# Patient Record
Sex: MALE | Race: WHITE | NOT HISPANIC OR LATINO | Employment: OTHER | URBAN - METROPOLITAN AREA
[De-identification: names, ages, dates, MRNs, and addresses within clinical notes are randomized per-mention and may not be internally consistent; named-entity substitution may affect disease eponyms.]

---

## 2017-01-24 ENCOUNTER — TELEPHONE (OUTPATIENT)
Dept: MEDICAL GROUP | Age: 62
End: 2017-01-24

## 2017-01-24 NOTE — TELEPHONE ENCOUNTER
1. Caller Name: Rosaura (wife)                                         Call Back Number: [phone]      Patient approves a detailed voicemail message: yes    2. Patient is requesting lab results dated: 11/02/16    3. Results scanned into media. Patient advised they will be contacted once interpreted by provider.pt wants to know if he should remain on the same dosage of the Levothyroxine?

## 2017-01-26 ENCOUNTER — TELEPHONE (OUTPATIENT)
Dept: MEDICAL GROUP | Age: 62
End: 2017-01-26

## 2017-01-26 NOTE — TELEPHONE ENCOUNTER
Phone Number Called: [phone] (home)     Message: Left message for patient to call back regarding provider message.    Left Message for patient to call back: yes

## 2017-01-26 NOTE — TELEPHONE ENCOUNTER
Phone Number Called: [phone] (home)     Message: Pt wants to know if he should continue on the same dosage of Levothyroxine. Please advise    Left Message for patient to call back: no

## 2017-01-26 NOTE — TELEPHONE ENCOUNTER
All normal; sorry for the delay- they were in a different part of the chart (media) and did not come through my inbasket since he had [facility]

## 2017-01-26 NOTE — TELEPHONE ENCOUNTER
Phone Number Called: [phone] (home)     Message: Left message for the patient to call us back regarding the note below.      Left Message for patient to call back: yes

## 2017-01-26 NOTE — TELEPHONE ENCOUNTER
Phone Number Called: [phone] (home)     Message: Left VM for patient to call back    Left Message for patient to call back: yes

## 2017-01-27 NOTE — TELEPHONE ENCOUNTER
FYI ONLY    Phone Number Called: [phone] (home)     Message: Patient's wife Rosaura notified of results and to continue Levothyroxine 70 MCG, verbalized understanding    Left Message for patient to call back: no

## 2017-05-30 DIAGNOSIS — D48.5 NEOPLASM OF UNCERTAIN BEHAVIOR OF SKIN: ICD-10-CM

## 2017-09-21 ENCOUNTER — OFFICE VISIT (OUTPATIENT)
Dept: MEDICAL GROUP | Age: 62
End: 2017-09-21
Payer: COMMERCIAL

## 2017-09-21 ENCOUNTER — HOSPITAL ENCOUNTER (OUTPATIENT)
Dept: RADIOLOGY | Facility: MEDICAL CENTER | Age: 62
End: 2017-09-21
Attending: INTERNAL MEDICINE
Payer: COMMERCIAL

## 2017-09-21 VITALS
TEMPERATURE: 97.5 F | HEART RATE: 70 BPM | HEIGHT: 70 IN | WEIGHT: 161 LBS | BODY MASS INDEX: 23.05 KG/M2 | DIASTOLIC BLOOD PRESSURE: 70 MMHG | OXYGEN SATURATION: 97 % | SYSTOLIC BLOOD PRESSURE: 104 MMHG

## 2017-09-21 DIAGNOSIS — R61 CHRONIC NIGHT SWEATS: ICD-10-CM

## 2017-09-21 DIAGNOSIS — E03.4 HYPOTHYROIDISM DUE TO ACQUIRED ATROPHY OF THYROID: ICD-10-CM

## 2017-09-21 DIAGNOSIS — M54.50 CHRONIC MIDLINE LOW BACK PAIN WITHOUT SCIATICA: ICD-10-CM

## 2017-09-21 DIAGNOSIS — R45.4 IRRITABILITY AND ANGER: ICD-10-CM

## 2017-09-21 DIAGNOSIS — Z12.5 SCREENING FOR PROSTATE CANCER: ICD-10-CM

## 2017-09-21 DIAGNOSIS — F41.9 ANXIETY AND DEPRESSION: ICD-10-CM

## 2017-09-21 DIAGNOSIS — E78.2 MIXED HYPERLIPIDEMIA: ICD-10-CM

## 2017-09-21 DIAGNOSIS — F32.A ANXIETY AND DEPRESSION: ICD-10-CM

## 2017-09-21 DIAGNOSIS — F17.200 TOBACCO DEPENDENCE: ICD-10-CM

## 2017-09-21 DIAGNOSIS — G89.29 CHRONIC MIDLINE LOW BACK PAIN WITHOUT SCIATICA: ICD-10-CM

## 2017-09-21 DIAGNOSIS — E55.9 VITAMIN D DEFICIENCY DISEASE: ICD-10-CM

## 2017-09-21 DIAGNOSIS — Z23 NEED FOR INFLUENZA VACCINATION: ICD-10-CM

## 2017-09-21 DIAGNOSIS — M15.9 PRIMARY OSTEOARTHRITIS INVOLVING MULTIPLE JOINTS: ICD-10-CM

## 2017-09-21 PROCEDURE — 90686 IIV4 VACC NO PRSV 0.5 ML IM: CPT | Performed by: INTERNAL MEDICINE

## 2017-09-21 PROCEDURE — 99215 OFFICE O/P EST HI 40 MIN: CPT | Mod: 25 | Performed by: INTERNAL MEDICINE

## 2017-09-21 PROCEDURE — 90471 IMMUNIZATION ADMIN: CPT | Performed by: INTERNAL MEDICINE

## 2017-09-21 PROCEDURE — 71020 DX-CHEST-2 VIEWS: CPT

## 2017-09-21 ASSESSMENT — ENCOUNTER SYMPTOMS
MUSCULOSKELETAL NEGATIVE: 1
GASTROINTESTINAL NEGATIVE: 1
CARDIOVASCULAR NEGATIVE: 1
CONSTITUTIONAL NEGATIVE: 1
RESPIRATORY NEGATIVE: 1
NEUROLOGICAL NEGATIVE: 1
EYES NEGATIVE: 1
PSYCHIATRIC NEGATIVE: 1

## 2017-09-21 NOTE — Clinical Note
Please let patient know that I added an ultrasound the abdomen to the patient's orders to make sure there is no lymphoma or tumors on the kidney that might be causing his night sweats

## 2017-09-21 NOTE — PROGRESS NOTES
"Subjective:      Hans Vega is a 62 y.o. male who presents with Hyperlipidemia (evaluation on blood work results) and Other (night sweats since April on and off )  and fatigue and irritablility/anger issues.    And   The patient is here for followup of chronic medical problems listed below. The patient is compliant with medications and having no side effects from them. Denies chest pain, abdominal pain, dyspnea, myalgias, or cough.   Patient Active Problem List    Diagnosis Date Noted   • Chronic night sweats 09/21/2017   • Irritability and anger 09/21/2017   • Vitamin D deficiency disease 12/11/2013   • Primary osteoarthritis involving multiple joints 07/10/2013   • Hypothyroidism due to acquired atrophy of thyroid 04/04/2013   • Tobacco dependence 04/04/2013   • Anxiety and depression 01/07/2013   • Mixed hyperlipidemia 01/07/2013   • Chronic midline low back pain without sciatica 12/17/2012     Allergies   Allergen Reactions   • Crestor [Rosuvastatin Calcium]      Myalgias     • Zetia [Ezetimibe]      myalgia         Outpatient Medications Prior to Visit   Medication Sig Dispense Refill   • levothyroxine (SYNTHROID) 75 MCG Tab TAKE ONE TABLET BY MOUTH ONCE DAILY 90 Tab 4   • lovastatin (MEVACOR) 20 MG Tab TAKE THREE TABLETS BY MOUTH ONCE DAILY 270 Tab 4   • Cholecalciferol (VITAMIN D-3) 5000 UNITS TABS Take 5,000 Units by mouth every day. 100 Tab 3     No facility-administered medications prior to visit.                HPI    Review of Systems   Constitutional: Negative.    HENT: Negative.    Eyes: Negative.    Respiratory: Negative.    Cardiovascular: Negative.    Gastrointestinal: Negative.    Genitourinary: Negative.    Musculoskeletal: Negative.    Skin: Negative.    Neurological: Negative.    Endo/Heme/Allergies: Negative.    Psychiatric/Behavioral: Negative.           Objective:     /70   Pulse 70   Temp 36.4 °C (97.5 °F)   Ht 1.778 m (5' 10\")   Wt 73 kg (161 lb)   SpO2 97%   BMI 23.10 " kg/m²      Physical Exam   Constitutional: He is oriented to person, place, and time. He appears well-developed and well-nourished. No distress.   HENT:   Head: Normocephalic and atraumatic.   Right Ear: External ear normal.   Left Ear: External ear normal.   Nose: Nose normal.   Mouth/Throat: Oropharynx is clear and moist. No oropharyngeal exudate.   Eyes: Conjunctivae and EOM are normal. Pupils are equal, round, and reactive to light. Right eye exhibits no discharge. Left eye exhibits no discharge. No scleral icterus.   Neck: Normal range of motion. Neck supple. No JVD present. No tracheal deviation present. No thyromegaly present.   Cardiovascular: Normal rate, regular rhythm, normal heart sounds and intact distal pulses.  Exam reveals no gallop and no friction rub.    No murmur heard.  Pulmonary/Chest: Effort normal and breath sounds normal. No stridor. No respiratory distress. He has no wheezes. He has no rales. He exhibits no tenderness.   Abdominal: Soft. Bowel sounds are normal. He exhibits no distension and no mass. There is no tenderness. There is no rebound and no guarding.   Musculoskeletal: Normal range of motion. He exhibits no edema or tenderness.   Lymphadenopathy:     He has no cervical adenopathy.   Neurological: He is alert and oriented to person, place, and time. He has normal reflexes. He displays normal reflexes. He exhibits normal muscle tone. Coordination normal.   Skin: Skin is warm and dry. No rash noted. He is not diaphoretic. No erythema. No pallor.   Psychiatric: He has a normal mood and affect. His behavior is normal. Judgment and thought content normal.     No visits with results within 1 Month(s) from this visit.   Latest known visit with results is:   Hospital Outpatient Visit on 09/02/2015   Component Date Value   • WBC 09/03/2015 13.7*   • RBC 09/03/2015 5.14    • Hemoglobin 09/03/2015 16.9    • Hematocrit 09/03/2015 50.3    • MCV 09/03/2015 97.9*   • MCH 09/03/2015 32.9    • MCHC  09/03/2015 33.6*   • RDW 09/03/2015 50.5*   • Platelet Count 09/03/2015 183    • MPV 09/03/2015 10.9    • Neutrophils-Polys 09/03/2015 62.70    • Lymphocytes 09/03/2015 26.10    • Monocytes 09/03/2015 7.70    • Eosinophils 09/03/2015 1.80    • Basophils 09/03/2015 0.90    • Immature Granulocytes 09/03/2015 0.80    • Nucleated RBC 09/03/2015 0.00    • Neutrophils (Absolute) 09/03/2015 8.57*   • Lymphs (Absolute) 09/03/2015 3.56    • Monos (Absolute) 09/03/2015 1.05*   • Eos (Absolute) 09/03/2015 0.24    • Baso (Absolute) 09/03/2015 0.12    • Immature Granulocytes (a* 09/03/2015 0.11    • NRBC (Absolute) 09/03/2015 0.00    • Sodium 09/03/2015 138    • Potassium 09/03/2015 4.3    • Chloride 09/03/2015 110    • Co2 09/03/2015 22    • Anion Gap 09/03/2015 6.0    • Glucose 09/03/2015 104*   • Bun 09/03/2015 18    • Creatinine 09/03/2015 1.06    • Calcium 09/03/2015 9.5    • AST(SGOT) 09/03/2015 19    • ALT(SGPT) 09/03/2015 26    • Alkaline Phosphatase 09/03/2015 57    • Total Bilirubin 09/03/2015 0.4    • Albumin 09/03/2015 4.4    • Total Protein 09/03/2015 7.5    • Globulin 09/03/2015 3.1    • A-G Ratio 09/03/2015 1.4    • Free T-4 09/03/2015 0.96    • TSH 09/03/2015 4.640*   • 25-Hydroxy   Vitamin D 25 09/03/2015 46    • Cholesterol,Tot 09/03/2015 226*   • Triglycerides 09/03/2015 92    • HDL 09/03/2015 54    • LDL 09/03/2015 154*   • GFR If  09/03/2015 >60    • GFR If Non  Ameri* 09/03/2015 >60       No results found for: HBA1C  Lab Results   Component Value Date/Time    SODIUM 138 09/02/2015 09:04 AM    POTASSIUM 4.3 09/02/2015 09:04 AM    CHLORIDE 110 09/02/2015 09:04 AM    CO2 22 09/02/2015 09:04 AM    GLUCOSE 104 (H) 09/02/2015 09:04 AM    BUN 18 09/02/2015 09:04 AM    CREATININE 1.06 09/02/2015 09:04 AM    ALKPHOSPHAT 57 09/02/2015 09:04 AM    ASTSGOT 19 09/02/2015 09:04 AM    ALTSGPT 26 09/02/2015 09:04 AM    TBILIRUBIN 0.4 09/02/2015 09:04 AM     No results found for: INR  Lab Results    Component Value Date/Time    CHOLSTRLTOT 226 (H) 09/02/2015 09:04 AM     (H) 09/02/2015 09:04 AM    HDL 54 09/02/2015 09:04 AM    TRIGLYCERIDE 92 09/02/2015 09:04 AM       No results found for: TESTOSTERONE  No results found for: TSH  Lab Results   Component Value Date/Time    FREET4 0.96 09/02/2015 09:04 AM    FREET4 0.93 11/05/2014 09:05 AM     Lab Results   Component Value Date/Time    URICACID 5.6 12/24/2012 08:46 AM     No components found for: VITB12  Lab Results   Component Value Date/Time    25HYDROXY 46 09/02/2015 09:04 AM    25HYDROXY 57 11/05/2014 09:05 AM               Assessment/Plan:     1. Chronic night sweats    This is a unclear etiology. His lab done last week was unremarkable his physical exams also unremarkable. To be sure there is no underlying infection will get chest x-ray and urinalysis. Make sure it is no more old issue will get TSH and testosterone level. This may be a chronic recurrent viral syndrome versus simple idiopathic. The only are thing to look forward the possible renal cell CA which we would need to order an ultrasound of the kidneys or abdomen. We will add this to the orders even if the urinalysis is normal.      - DX-CHEST-2 VIEWS; Future  - POCT Urinalysis  - URINE CULTURE(NEW); Future    2. Irritability and anger   Probably manifestation of underlying depression. We'll need to see patient back for further follow-up to discuss possible depressant therapy and/or counseling which he has declined.    3. Hypothyroidism due to acquired atrophy of thyroid   Under good control. Continue same regimen.    - TSH; Future  - PROSTATE SPECIFIC AG DIAGNOSTIC; Future  - TESTOSTERONE, FREE AND TOTAL    4. Tobacco dependence   Patient counseled. Encouraged to quit tobacco products. NicoDerm prescribed.      5. Mixed hyperlipidemia   Under good control. Continue same regimen.    - LIPID PROFILE; Future  - CBC WITH DIFFERENTIAL; Future  - WESTERGREN SED RATE; Future  - CRP HIGH  SENSITIVE (CARDIAC); Future  - COMP METABOLIC PANEL; Future    6. Vitamin D deficiency disease    Under good control. Continue same regimen.    7. Anxiety and depression     Under good control. Continue same regimen.    8. Screening for prostate cancer        - PROSTATE SPECIFIC AG SCREENING; Future    9. Need for influenza vaccination     - Flu Quad Inj >3 Year Pre-Filled (Preservative Free)    10. Primary osteoarthritis involving multiple joints    Under good control. Continue same regimen.  11. Chronic midline low back pain without sciatica     Under good control. Continue same regimen.        40 minute face-to-face encounter took place today.  More than half of this time was spent in the coordination of care of the above problems, as well as counseling.

## 2017-11-20 DIAGNOSIS — E78.5 HYPERLIPIDEMIA, UNSPECIFIED HYPERLIPIDEMIA TYPE: ICD-10-CM

## 2017-11-21 RX ORDER — LEVOTHYROXINE SODIUM 0.07 MG/1
TABLET ORAL
Qty: 90 TAB | Refills: 0 | Status: SHIPPED | OUTPATIENT
Start: 2017-11-21 | End: 2018-03-21 | Stop reason: SDUPTHER

## 2017-11-21 RX ORDER — LOVASTATIN 20 MG/1
TABLET ORAL
Qty: 90 TAB | Refills: 0 | Status: SHIPPED | OUTPATIENT
Start: 2017-11-21 | End: 2018-01-12 | Stop reason: SDUPTHER

## 2017-11-22 ENCOUNTER — APPOINTMENT (RX ONLY)
Dept: URBAN - METROPOLITAN AREA CLINIC 4 | Facility: CLINIC | Age: 62
Setting detail: DERMATOLOGY
End: 2017-11-22

## 2017-11-22 DIAGNOSIS — L73.8 OTHER SPECIFIED FOLLICULAR DISORDERS: ICD-10-CM

## 2017-11-22 DIAGNOSIS — L72.8 OTHER FOLLICULAR CYSTS OF THE SKIN AND SUBCUTANEOUS TISSUE: ICD-10-CM

## 2017-11-22 PROCEDURE — ? OBSERVATION AND MEASURE

## 2017-11-22 PROCEDURE — ? ADDITIONAL NOTES

## 2017-11-22 PROCEDURE — ? COUNSELING

## 2017-11-22 PROCEDURE — 99202 OFFICE O/P NEW SF 15 MIN: CPT

## 2017-11-22 ASSESSMENT — LOCATION DETAILED DESCRIPTION DERM
LOCATION DETAILED: RIGHT CENTRAL MALAR CHEEK
LOCATION DETAILED: LEFT SUPERIOR UPPER BACK

## 2017-11-22 ASSESSMENT — LOCATION SIMPLE DESCRIPTION DERM
LOCATION SIMPLE: RIGHT CHEEK
LOCATION SIMPLE: LEFT UPPER BACK

## 2017-11-22 ASSESSMENT — LOCATION ZONE DERM
LOCATION ZONE: TRUNK
LOCATION ZONE: FACE

## 2017-11-22 NOTE — PROCEDURE: ADDITIONAL NOTES
Additional Notes: Advised it is cosmetic if would like treated
Additional Notes: Advised can have excised if bothersome with MD

## 2018-03-21 ENCOUNTER — OFFICE VISIT (OUTPATIENT)
Dept: MEDICAL GROUP | Age: 63
End: 2018-03-21
Payer: COMMERCIAL

## 2018-03-21 VITALS
DIASTOLIC BLOOD PRESSURE: 80 MMHG | TEMPERATURE: 97.3 F | HEIGHT: 70 IN | BODY MASS INDEX: 23.28 KG/M2 | OXYGEN SATURATION: 94 % | WEIGHT: 162.6 LBS | SYSTOLIC BLOOD PRESSURE: 122 MMHG | HEART RATE: 60 BPM

## 2018-03-21 DIAGNOSIS — E55.9 VITAMIN D DEFICIENCY DISEASE: ICD-10-CM

## 2018-03-21 DIAGNOSIS — D48.5 NEOPLASM OF UNCERTAIN BEHAVIOR OF SKIN: ICD-10-CM

## 2018-03-21 DIAGNOSIS — E78.2 MIXED HYPERLIPIDEMIA: ICD-10-CM

## 2018-03-21 DIAGNOSIS — E03.4 HYPOTHYROIDISM DUE TO ACQUIRED ATROPHY OF THYROID: ICD-10-CM

## 2018-03-21 DIAGNOSIS — Z23 NEED FOR VACCINATION: ICD-10-CM

## 2018-03-21 DIAGNOSIS — F17.200 TOBACCO DEPENDENCE: ICD-10-CM

## 2018-03-21 DIAGNOSIS — R61 CHRONIC NIGHT SWEATS: ICD-10-CM

## 2018-03-21 DIAGNOSIS — H92.09 OTALGIA, UNSPECIFIED LATERALITY: ICD-10-CM

## 2018-03-21 PROBLEM — R45.4 IRRITABILITY AND ANGER: Status: RESOLVED | Noted: 2017-09-21 | Resolved: 2018-03-21

## 2018-03-21 PROCEDURE — 90732 PPSV23 VACC 2 YRS+ SUBQ/IM: CPT | Performed by: INTERNAL MEDICINE

## 2018-03-21 PROCEDURE — 99214 OFFICE O/P EST MOD 30 MIN: CPT | Mod: 25 | Performed by: INTERNAL MEDICINE

## 2018-03-21 PROCEDURE — 90715 TDAP VACCINE 7 YRS/> IM: CPT | Performed by: INTERNAL MEDICINE

## 2018-03-21 PROCEDURE — 90472 IMMUNIZATION ADMIN EACH ADD: CPT | Performed by: INTERNAL MEDICINE

## 2018-03-21 PROCEDURE — 90471 IMMUNIZATION ADMIN: CPT | Performed by: INTERNAL MEDICINE

## 2018-03-21 RX ORDER — LOVASTATIN 20 MG/1
40 TABLET ORAL DAILY
Qty: 180 TAB | Refills: 4 | Status: SHIPPED | OUTPATIENT
Start: 2018-03-21 | End: 2018-09-25

## 2018-03-21 RX ORDER — LEVOTHYROXINE SODIUM 0.07 MG/1
TABLET ORAL
Qty: 90 TAB | Refills: 4 | Status: SHIPPED | OUTPATIENT
Start: 2018-03-21 | End: 2019-04-17 | Stop reason: SDUPTHER

## 2018-03-21 ASSESSMENT — ENCOUNTER SYMPTOMS
CONSTITUTIONAL NEGATIVE: 1
PSYCHIATRIC NEGATIVE: 1
EYES NEGATIVE: 1
RESPIRATORY NEGATIVE: 1
GASTROINTESTINAL NEGATIVE: 1
MUSCULOSKELETAL NEGATIVE: 1
CARDIOVASCULAR NEGATIVE: 1
NEUROLOGICAL NEGATIVE: 1

## 2018-03-21 ASSESSMENT — PATIENT HEALTH QUESTIONNAIRE - PHQ9
SUM OF ALL RESPONSES TO PHQ QUESTIONS 1-9: 7
CLINICAL INTERPRETATION OF PHQ2 SCORE: 3
5. POOR APPETITE OR OVEREATING: 0 - NOT AT ALL

## 2018-03-21 NOTE — PROGRESS NOTES
"Subjective:      Hans Vega is a 62 y.o. male who presents with Skin Lesion (Right ear, Reoccurring, Right ear, top); Otalgia (Left Ear); Sweats (At night); and Results  and  The patient is here for followup of chronic medical problems listed below. The patient is compliant with medications and having no side effects from them. Denies chest pain, abdominal pain, dyspnea, myalgias, or cough.   Patient Active Problem List    Diagnosis Date Noted   • Otalgia 03/21/2018   • Neoplasm of uncertain behavior of skin- right ear helix 03/21/2018   • Chronic night sweats 09/21/2017   • Vitamin D deficiency disease 12/11/2013   • Hypothyroidism due to acquired atrophy of thyroid 04/04/2013   • Tobacco dependence 04/04/2013   • Mixed hyperlipidemia 01/07/2013        Outpatient Medications Prior to Visit   Medication Sig Dispense Refill   • Cholecalciferol (VITAMIN D-3) 5000 UNITS TABS Take 5,000 Units by mouth every day. 100 Tab 3   • lovastatin (MEVACOR) 20 MG Tab TAKE THREE TABLETS BY MOUTH ONCE DAILY 90 Tab 4   • levothyroxine (SYNTHROID) 75 MCG Tab TAKE ONE TABLET BY MOUTH ONCE DAILY 90 Tab 0     No facility-administered medications prior to visit.        .  Allergies   Allergen Reactions   • Crestor [Rosuvastatin Calcium]      Myalgias     • Zetia [Ezetimibe]      myalgia           .          HPI    Review of Systems   Constitutional: Negative.    HENT: Negative.    Eyes: Negative.    Respiratory: Negative.    Cardiovascular: Negative.    Gastrointestinal: Negative.    Genitourinary: Negative.    Musculoskeletal: Negative.    Skin: Negative.    Neurological: Negative.    Endo/Heme/Allergies: Negative.    Psychiatric/Behavioral: Negative.           Objective:     /80   Pulse 60   Temp 36.3 °C (97.3 °F)   Ht 1.778 m (5' 10\")   Wt 73.8 kg (162 lb 9.6 oz)   SpO2 94%   BMI 23.33 kg/m²      Physical Exam   Constitutional: He is oriented to person, place, and time. He appears well-developed and well-nourished. No " distress.   HENT:   Head: Normocephalic and atraumatic.   Left Ear: External ear normal.   Nose: Nose normal.   Mouth/Throat: Oropharynx is clear and moist. No oropharyngeal exudate.   1 cm raised pearly lesion with central .5 cm ulceration right superior helix   Eyes: Conjunctivae and EOM are normal. Pupils are equal, round, and reactive to light. Right eye exhibits no discharge. Left eye exhibits no discharge. No scleral icterus.   Neck: Normal range of motion. Neck supple. No JVD present. No tracheal deviation present. No thyromegaly present.   Cardiovascular: Normal rate, regular rhythm, normal heart sounds and intact distal pulses.  Exam reveals no gallop and no friction rub.    No murmur heard.  Pulmonary/Chest: Effort normal and breath sounds normal. No stridor. No respiratory distress. He has no wheezes. He has no rales. He exhibits no tenderness.   Abdominal: Soft. Bowel sounds are normal. He exhibits no distension and no mass. There is no tenderness. There is no rebound and no guarding.   Musculoskeletal: Normal range of motion. He exhibits no edema or tenderness.   Lymphadenopathy:     He has no cervical adenopathy.   Neurological: He is alert and oriented to person, place, and time. He has normal reflexes. He displays normal reflexes. He exhibits normal muscle tone. Coordination normal.   Skin: Skin is warm and dry. No rash noted. He is not diaphoretic. No erythema. No pallor.   Psychiatric: He has a normal mood and affect. His behavior is normal. Judgment and thought content normal.   [facility] wnl x for FUC=[id number] BUT GOOD TC/HDL =<4.0- [facility]F [facility]            Assessment/Plan:     1. Need for vaccination      - Pneumococal Polysaccharide Vaccine 23-Valent =>3yo SQ/IM  - TDAP VACCINE =>8YO IM    2. Hypothyroidism due to acquired atrophy of thyroid     - levothyroxine (SYNTHROID) 75 MCG Tab; TAKE ONE TABLET BY MOUTH ONCE DAILY  Dispense: 90 Tab; Refill: 4  - TSH; Future    3. Mixed  hyperlipidemia   BORDERLINE CONTROL- CANNOT TOLERATE HIGHER DOSE OF STATIN; CONT CURRENT DOSE:  - lovastatin (MEVACOR) 20 MG Tab; Take 2 Tabs by mouth every day.  Dispense: 180 Tab; Refill: 4  - COMP METABOLIC PANEL; Future  - LIPID PROFILE; Future  - CBC WITH DIFFERENTIAL; Future    4. Tobacco dependence   Patient counseled. Encouraged to quit tobacco products. NicoDerm prescribed.      5. Chronic night sweats    ? ETIOLOGY. SURVEILLANCE- ALL LABS AND CXR UNREMARKABLE; NO WT LOSS COUGH ABD PAIN DIARRHEA OR OTHER CONSTITUTION SX.     6. Otalgia, unspecified laterality     - REFERRAL TO ENT    7. 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 Vitamin D deficiency disease   Under good control. Continue same regimen.  VIT D3 5KU QD    30 minute face-to-face encounter took place today.  More than half of this time was spent in the coordination of care of the above problems, as well as counseling.

## 2018-03-21 NOTE — PATIENT INSTRUCTIONS
Basal Cell Carcinoma  Introduction  Basal cell carcinoma is the most common form of skin cancer. It begins in the basal cells, which are at the bottom of the outer skin layer (epidermis). It occurs most often on parts of the body that are frequently exposed to the sun, such as:  · Parts of the head, including the scalp or face.  · Ears.  · Neck.  · Arms or legs.  · Backs of the hands.  Basal cell carcinoma can almost always be cured. It rarely spreads to other areas of the body (metastasizes). Basal cell carcinoma may come back at the same location (recur), but it can be treated again if this occurs.  What are the causes?  This condition is usually caused by exposure to ultraviolet (UV) light. UV light may come from the sun or from tanning beds. Other causes include:  · Exposure to arsenic.  · Exposure to radiation.  · Exposure to toxic tars and oils.  · Certain genetic conditions, such as xeroderma pigmentosum.  What increases the risk?  This condition is more likely to develop in:  · People who are older than 40 years of age.  · People who have fair skin (light complexion).  · People who have blonde or red hair.  · People who have blue, green, or gray eyes.  · People who have childhood freckling.  · People who have had sun exposure over long periods of time, especially during childhood.  · People who have had repeated sunburns.  · People who have a weakened immune system.  · People who have been exposed to certain chemicals, such as tar, soot, and arsenic.  · People who have chronic inflammatory conditions.  · People who have chronic infections.  · People who use tanning beds.  What are the signs or symptoms?  The main symptom of this condition is a growth or lesion on the skin. The shape and color of the growth or lesion may vary. The five main types include:  · An open sore that may remain open for 3 weeks or longer. The sore may bleed or crust. This type of lesion can be an early sign of basal cell carcinoma.  Basal cell carcinoma often shows up as a sore that does not heal.  · A reddish area that may crust, itch, or cause discomfort. This may occur on areas that are exposed to the sun. These patches might be easier to feel than to see.  · A shiny or clear bump that is red, white, or pink. In people who have dark hair, the bump is often tan, black, or brown. These bumps can look like moles.  · A pink growth with a raised border. The growth will have a crusted and indented area in the center. Small blood vessels may appear on the surface of the growth as it gets bigger.  · A scarlike area that looks like shiny, stretched skin. The area may be white, yellow, or waxy. It often has irregular borders. This may be a sign of more aggressive basal cell carcinoma.  How is this diagnosed?  This condition may be diagnosed with:  · A physical exam.  · Removal of a tissue sample to be examined under a microscope (biopsy).  How is this treated?  Treatment for this condition involves removing the cancerous tissue. The method that is used for this depends on the type, size, location, and number of tumors. Possible treatments include:  · Mohs surgery. In this procedure, the cancerous skin cells are removed layer by layer until all of the tumor has been removed.  · Surgical removal (excision) of the tumor. This involves removing the entire tumor and a small amount of normal skin that surrounds it.  · Cryosurgery. This involves freezing the tumor with liquid nitrogen.  · Plastic surgery. The tumor is removed, and healthy skin from another part of the body is used to cover the wound. This may be done for large tumors that are in areas where it is not possible to stretch the nearby skin to sew the edges of the wound together.  · Radiation. This may be used for tumors on the face.  · Photodynamic therapy. A chemical cream is applied to the skin, and light exposure is used to activate the chemical.  · Electrodesiccation and curettage. This  involves alternately scraping and burning the tumor while using an electric current to control bleeding.  · Chemical treatments, such as imiquimod cream and interferon injections. These may be used to remove superficial tumors with minimal scarring.  Follow these instructions at home:  · Avoid unprotected sun exposure.  · Do self-exams as told by your health care provider. Look for new spots or changes in your skin.  · Keep all follow-up visits as told by your health care provider. This is important.  How is this prevented?  · Avoid the sun when it is the strongest. This is usually between 10:00 a.m. and 4:00 p.m.  · When you are out in the sun, use a sunscreen that has a sun protection factor (SPF) of at least 30.  · Apply sunscreen at least 30 minutes before exposure to the sun.  · Reapply sunscreen every 2-4 hours while you are outside. Also reapply it after swimming and after excessive sweating.  · Always wear hats, protective clothing, and UV-blocking sunglasses when you are outdoors.  · Do not use tanning beds.  Contact a health care provider if:  · You notice any new spots or any changes in your skin.  · You have had a basal cell carcinoma tumor removed and you notice a new growth in the same location.  This information is not intended to replace advice given to you by your health care provider. Make sure you discuss any questions you have with your health care provider.  Document Released: 06/23/2004 Document Revised: 05/25/2017 Document Reviewed: 04/11/2016  © 2017 [employer]

## 2018-09-25 ENCOUNTER — OFFICE VISIT (OUTPATIENT)
Dept: MEDICAL GROUP | Age: 63
End: 2018-09-25
Payer: COMMERCIAL

## 2018-09-25 VITALS
HEART RATE: 58 BPM | WEIGHT: 160 LBS | BODY MASS INDEX: 22.9 KG/M2 | HEIGHT: 70 IN | TEMPERATURE: 97.9 F | DIASTOLIC BLOOD PRESSURE: 75 MMHG | OXYGEN SATURATION: 97 % | SYSTOLIC BLOOD PRESSURE: 116 MMHG

## 2018-09-25 DIAGNOSIS — Z23 IMMUNIZATION DUE: ICD-10-CM

## 2018-09-25 DIAGNOSIS — E78.2 MIXED HYPERLIPIDEMIA: ICD-10-CM

## 2018-09-25 DIAGNOSIS — E55.9 VITAMIN D DEFICIENCY DISEASE: ICD-10-CM

## 2018-09-25 DIAGNOSIS — F17.200 TOBACCO DEPENDENCE: ICD-10-CM

## 2018-09-25 DIAGNOSIS — E03.4 HYPOTHYROIDISM DUE TO ACQUIRED ATROPHY OF THYROID: ICD-10-CM

## 2018-09-25 DIAGNOSIS — D48.5 NEOPLASM OF UNCERTAIN BEHAVIOR OF SKIN: ICD-10-CM

## 2018-09-25 PROBLEM — H92.09 OTALGIA: Status: RESOLVED | Noted: 2018-03-21 | Resolved: 2018-09-25

## 2018-09-25 PROBLEM — R61 CHRONIC NIGHT SWEATS: Status: RESOLVED | Noted: 2017-09-21 | Resolved: 2018-09-25

## 2018-09-25 PROCEDURE — 90471 IMMUNIZATION ADMIN: CPT | Performed by: INTERNAL MEDICINE

## 2018-09-25 PROCEDURE — 90686 IIV4 VACC NO PRSV 0.5 ML IM: CPT | Performed by: INTERNAL MEDICINE

## 2018-09-25 PROCEDURE — 99214 OFFICE O/P EST MOD 30 MIN: CPT | Mod: 25 | Performed by: INTERNAL MEDICINE

## 2018-09-25 RX ORDER — ROSUVASTATIN CALCIUM 20 MG/1
20 TABLET, COATED ORAL EVERY EVENING
Qty: 90 TAB | Refills: 4 | Status: SHIPPED | OUTPATIENT
Start: 2018-09-25 | End: 2019-03-26

## 2018-09-25 ASSESSMENT — ENCOUNTER SYMPTOMS
MUSCULOSKELETAL NEGATIVE: 1
EYES NEGATIVE: 1
CARDIOVASCULAR NEGATIVE: 1
RESPIRATORY NEGATIVE: 1
NEUROLOGICAL NEGATIVE: 1
CONSTITUTIONAL NEGATIVE: 1
PSYCHIATRIC NEGATIVE: 1
GASTROINTESTINAL NEGATIVE: 1

## 2018-09-25 NOTE — PROGRESS NOTES
"Subjective:      Hans Vega is a 63 y.o. male who presents with Follow-Up (lab review) and Medication Management (cholesterol)   The patient is here for followup of chronic medical problems listed below. The patient is compliant with medications and having no side effects from them. Denies chest pain, abdominal pain, dyspnea, myalgias, or cough.   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 100 Tab 3   • lovastatin (MEVACOR) 20 MG Tab Take 2 Tabs by mouth every day. 180 Tab 4     No facility-administered medications prior to visit.              HPI    Review of Systems   Constitutional: Negative.    HENT: Negative.    Eyes: Negative.    Respiratory: Negative.    Cardiovascular: Negative.    Gastrointestinal: Negative.    Genitourinary: Negative.    Musculoskeletal: Negative.    Skin: Negative.    Neurological: Negative.    Endo/Heme/Allergies: Negative.    Psychiatric/Behavioral: Negative.           Objective:     /75 (BP Location: Left arm, Patient Position: Sitting)   Pulse (!) 58   Temp 36.6 °C (97.9 °F) (Temporal)   Ht 1.778 m (5' 10\")   Wt 72.6 kg (160 lb)   SpO2 97%   BMI 22.96 kg/m²      Physical Exam   Constitutional: He is oriented to person, place, and time. He appears well-developed and well-nourished. No distress.   HENT:   Head: Normocephalic and atraumatic.   Right Ear: External ear normal.   Left Ear: External ear normal.   Nose: Nose normal.   Mouth/Throat: Oropharynx " is clear and moist. No oropharyngeal exudate.   Eyes: Pupils are equal, round, and reactive to light. Conjunctivae and EOM are normal. Right eye exhibits no discharge. Left eye exhibits no discharge. No scleral icterus.   Neck: Normal range of motion. Neck supple. No JVD present. No tracheal deviation present. No thyromegaly present.   Cardiovascular: Normal rate, regular rhythm, normal heart sounds and intact distal pulses.  Exam reveals no gallop and no friction rub.    No murmur heard.  Pulmonary/Chest: Effort normal and breath sounds normal. No stridor. No respiratory distress. He has no wheezes. He has no rales. He exhibits no tenderness.   Abdominal: Soft. Bowel sounds are normal. He exhibits no distension and no mass. There is no tenderness. There is no rebound and no guarding.   Musculoskeletal: Normal range of motion. He exhibits no edema or tenderness.   Lymphadenopathy:     He has no cervical adenopathy.   Neurological: He is alert and oriented to person, place, and time. He has normal reflexes. He displays normal reflexes. He exhibits normal muscle tone. Coordination normal.   Skin: Skin is warm and dry. No rash noted. He is not diaphoretic. No erythema. No pallor.   Psychiatric: He has a normal mood and affect. His behavior is normal. Judgment and thought content normal.          No visits with results within 1 Month(s) from this visit.   Latest known visit with results is:   Hospital Outpatient Visit on 09/02/2015   Component Date Value   • WBC 09/02/2015 13.7*   • RBC 09/02/2015 5.14    • Hemoglobin 09/02/2015 16.9    • Hematocrit 09/02/2015 50.3    • MCV 09/02/2015 97.9*   • MCH 09/02/2015 32.9    • MCHC 09/02/2015 33.6*   • RDW 09/02/2015 50.5*   • Platelet Count 09/02/2015 183    • MPV 09/02/2015 10.9    • Neutrophils-Polys 09/02/2015 62.70    • Lymphocytes 09/02/2015 26.10    • Monocytes 09/02/2015 7.70    • Eosinophils 09/02/2015 1.80    • Basophils 09/02/2015 0.90    • Immature Granulocytes  09/02/2015 0.80    • Nucleated RBC 09/02/2015 0.00    • Neutrophils (Absolute) 09/02/2015 8.57*   • Lymphs (Absolute) 09/02/2015 3.56    • Monos (Absolute) 09/02/2015 1.05*   • Eos (Absolute) 09/02/2015 0.24    • Baso (Absolute) 09/02/2015 0.12    • Immature Granulocytes (a* 09/02/2015 0.11    • NRBC (Absolute) 09/02/2015 0.00    • Sodium 09/02/2015 138    • Potassium 09/02/2015 4.3    • Chloride 09/02/2015 110    • Co2 09/02/2015 22    • Anion Gap 09/02/2015 6.0    • Glucose 09/02/2015 104*   • Bun 09/02/2015 18    • Creatinine 09/02/2015 1.06    • Calcium 09/02/2015 9.5    • AST(SGOT) 09/02/2015 19    • ALT(SGPT) 09/02/2015 26    • Alkaline Phosphatase 09/02/2015 57    • Total Bilirubin 09/02/2015 0.4    • Albumin 09/02/2015 4.4    • Total Protein 09/02/2015 7.5    • Globulin 09/02/2015 3.1    • A-G Ratio 09/02/2015 1.4    • Free T-4 09/02/2015 0.96    • TSH 09/02/2015 4.640*   • 25-Hydroxy   Vitamin D 25 09/02/2015 46    • Cholesterol,Tot 09/02/2015 226*   • Triglycerides 09/02/2015 92    • HDL 09/02/2015 54    • LDL 09/02/2015 154*   • GFR If  09/02/2015 >60    • GFR If Non  [employer]* 09/02/2015 >60       No results found for: HBA1C  Lab Results   Component Value Date/Time    SODIUM 138 09/02/2015 09:04 AM    POTASSIUM 4.3 09/02/2015 09:04 AM    CHLORIDE 110 09/02/2015 09:04 AM    CO2 22 09/02/2015 09:04 AM    GLUCOSE 104 (H) 09/02/2015 09:04 AM    BUN 18 09/02/2015 09:04 AM    CREATININE 1.06 09/02/2015 09:04 AM    ALKPHOSPHAT 57 09/02/2015 09:04 AM    ASTSGOT 19 09/02/2015 09:04 AM    ALTSGPT 26 09/02/2015 09:04 AM    TBILIRUBIN 0.4 09/02/2015 09:04 AM     No results found for: INR  Lab Results   Component Value Date/Time    CHOLSTRLTOT 226 (H) 09/02/2015 09:04 AM     (H) 09/02/2015 09:04 AM    HDL 54 09/02/2015 09:04 AM    TRIGLYCERIDE 92 09/02/2015 09:04 AM       No results found for: TESTOSTERONE  No results found for: TSH  Lab Results   Component Value Date/Time    FREET4 0.96  09/02/2015 09:04 AM    FREET4 0.93 11/05/2014 09:05 AM     Lab Results   Component Value Date/Time    URICACID 5.6 12/24/2012 08:46 AM     No components found for: VITB12  Lab Results   Component Value Date/Time    25HYDROXY 46 09/02/2015 09:04 AM    25HYDROXY 57 11/05/2014 09:05 AM          Assessment/Plan:      1. Immunization due     - Influenza Vaccine Quad Injection >3Y (PF)    2. Mixed hyperlipidemia   not at goal; patient will switch to Crestor  - rosuvastatin (CRESTOR) 20 MG Tab; Take 1 Tab by mouth every evening.  Dispense: 90 Tab; Refill: 4  - COMP METABOLIC PANEL; Future  - LIPID PROFILE; Future  - CBC WITH DIFFERENTIAL; Future    3. Hypothyroidism due to acquired atrophy of thyroid  Good control.  Continue same regimen.  Check TSH annually.  Therefore TSH not ordered for next office visit in 6 months    4. Tobacco dependence   Patient counseled. Encouraged to quit tobacco products. NicoDerm prescribed.      5. Vitamin D deficiency disease   Under good control. Continue same regimen.      6. 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  More than half of this time was spent in the coordination of care of the above problems, as well as counseling.

## 2018-10-23 ENCOUNTER — APPOINTMENT (RX ONLY)
Dept: URBAN - METROPOLITAN AREA CLINIC 22 | Facility: CLINIC | Age: 63
Setting detail: DERMATOLOGY
End: 2018-10-23

## 2018-10-23 DIAGNOSIS — L81.4 OTHER MELANIN HYPERPIGMENTATION: ICD-10-CM

## 2018-10-23 DIAGNOSIS — L57.8 OTHER SKIN CHANGES DUE TO CHRONIC EXPOSURE TO NONIONIZING RADIATION: ICD-10-CM

## 2018-10-23 PROBLEM — J30.1 ALLERGIC RHINITIS DUE TO POLLEN: Status: ACTIVE | Noted: 2018-10-23

## 2018-10-23 PROBLEM — D48.5 NEOPLASM OF UNCERTAIN BEHAVIOR OF SKIN: Status: ACTIVE | Noted: 2018-10-23

## 2018-10-23 PROCEDURE — ? COUNSELING

## 2018-10-23 PROCEDURE — 69100 BIOPSY OF EXTERNAL EAR: CPT

## 2018-10-23 PROCEDURE — ? BIOPSY BY SHAVE METHOD

## 2018-10-23 PROCEDURE — 99213 OFFICE O/P EST LOW 20 MIN: CPT | Mod: 25

## 2018-10-23 ASSESSMENT — LOCATION SIMPLE DESCRIPTION DERM
LOCATION SIMPLE: INFERIOR FOREHEAD
LOCATION SIMPLE: LEFT CHEEK
LOCATION SIMPLE: RIGHT CHEEK
LOCATION SIMPLE: LEFT LIP
LOCATION SIMPLE: RIGHT FOREHEAD

## 2018-10-23 ASSESSMENT — LOCATION DETAILED DESCRIPTION DERM
LOCATION DETAILED: LEFT CENTRAL MALAR CHEEK
LOCATION DETAILED: INFERIOR MID FOREHEAD
LOCATION DETAILED: LEFT LOWER CUTANEOUS LIP
LOCATION DETAILED: RIGHT CENTRAL MALAR CHEEK
LOCATION DETAILED: RIGHT MEDIAL FOREHEAD

## 2018-10-23 ASSESSMENT — LOCATION ZONE DERM
LOCATION ZONE: LIP
LOCATION ZONE: FACE

## 2018-10-23 NOTE — PROCEDURE: BIOPSY BY SHAVE METHOD
Cryotherapy Text: The wound bed was treated with cryotherapy after the biopsy was performed.
Additional Anesthesia Volume In Cc (Will Not Render If 0): 0
Electrodesiccation Text: The wound bed was treated with electrodesiccation after the biopsy was performed.
Bill 23865 For Specimen Handling/Conveyance To Laboratory?: no
Electrodesiccation And Curettage Text: The wound bed was treated with electrodesiccation and curettage after the biopsy was performed.
Consent: Written consent was obtained and risks were reviewed including but not limited to scarring, infection, bleeding, scabbing, incomplete removal, nerve damage and allergy to anesthesia.
Billing Type: Third-Party Bill
Anesthesia Volume In Cc: 2
Notification Instructions: Patient will be notified of biopsy results. However, patient instructed to call the office if not contacted within 2 weeks.
Wound Care: Petrolatum
Curettage Text: The wound bed was treated with curettage after the biopsy was performed.
Render Post-Care Instructions In Note?: yes
Depth Of Biopsy: dermis
Dressing: pressure dressing with telfa
Type Of Destruction Used: Curettage
Detail Level: Detailed
Biopsy Method: Personna blade
Anesthesia Type: 1% lidocaine with 1:100,000 epinephrine and a 1:10 solution of 8.4% sodium bicarbonate
Lab Facility: 
Biopsy Type: H and E
Silver Nitrate Text: The wound bed was treated with silver nitrate after the biopsy was performed.
Post-Care Instructions: I reviewed with the patient in detail post-care instructions. Patient is to keep the biopsy site dry overnight. Gentle cleansing daily.  Apply petroleum ointment daily until healed. Patient may apply hydrogen peroxide soaks to remove any crusting.
Hemostasis: Drysol
Lab: [id number]

## 2018-11-20 ENCOUNTER — APPOINTMENT (RX ONLY)
Dept: URBAN - METROPOLITAN AREA CLINIC 22 | Facility: CLINIC | Age: 63
Setting detail: DERMATOLOGY
End: 2018-11-20

## 2018-11-20 PROBLEM — C44.212 BASAL CELL CARCINOMA OF SKIN OF RIGHT EAR AND EXTERNAL AURICULAR CANAL: Status: ACTIVE | Noted: 2018-11-20

## 2018-11-20 PROCEDURE — ? MOHS SURGERY PHONE CONSULTATION

## 2018-11-20 NOTE — PROCEDURE: MOHS SURGERY PHONE CONSULTATION
Office Location Of Mohs Surgery: [facility]
Does The Patient Take Blood Thinners?: No
Has The Pathology Report Been Received?: Yes
Detail Level: Simple
Which Antibiotic Do They Take For Surgical Prophylaxis?: Amoxicillin (2 grams)
Date Of Mohs Surgery: 12/18/2018
Pathology Accession #: [id number]
Patient's Insurance: [employer]
Patient Reported Location: right superior posterior helix,
Additional Information?: sometimes takes ibuprofen
Time Of Mohs Surgery: 830 am
Referring Provider: Dr. India Monique

## 2018-12-18 ENCOUNTER — APPOINTMENT (RX ONLY)
Dept: URBAN - METROPOLITAN AREA CLINIC 36 | Facility: CLINIC | Age: 63
Setting detail: DERMATOLOGY
End: 2018-12-18

## 2018-12-18 VITALS — DIASTOLIC BLOOD PRESSURE: 97 MMHG | HEART RATE: 52 BPM | SYSTOLIC BLOOD PRESSURE: 140 MMHG

## 2018-12-18 DIAGNOSIS — L57.8 OTHER SKIN CHANGES DUE TO CHRONIC EXPOSURE TO NONIONIZING RADIATION: ICD-10-CM

## 2018-12-18 PROBLEM — C44.212 BASAL CELL CARCINOMA OF SKIN OF RIGHT EAR AND EXTERNAL AURICULAR CANAL: Status: ACTIVE | Noted: 2018-12-18

## 2018-12-18 PROBLEM — Z85.828 PERSONAL HISTORY OF OTHER MALIGNANT NEOPLASM OF SKIN: Status: ACTIVE | Noted: 2018-12-18

## 2018-12-18 PROCEDURE — 17312 MOHS ADDL STAGE: CPT

## 2018-12-18 PROCEDURE — ? MOHS SURGERY

## 2018-12-18 PROCEDURE — 17311 MOHS 1 STAGE H/N/HF/G: CPT

## 2018-12-18 PROCEDURE — ? COUNSELING

## 2018-12-18 PROCEDURE — 13152 CMPLX RPR E/N/E/L 2.6-7.5 CM: CPT

## 2018-12-18 NOTE — PROCEDURE: MOHS SURGERY
Quadrant Reporting?: no
Stage 1: Number Of Blocks?: 1
Asc Procedure Text (C): After obtaining clear surgical margins the patient was sent to an ASC for surgical repair.  The patient understands they will receive post-surgical care and follow-up from the ASC physician.
Show Asc Variables: Yes
Bi-Rhombic Flap Text: The defect edges were debeveled with a #15 scalpel blade.  Given the location of the defect and the proximity to free margins a bi-rhombic flap was deemed most appropriate.  Using a sterile surgical marker, an appropriate rhombic flap was drawn incorporating the defect. The area thus outlined was incised deep to adipose tissue with a #15 scalpel blade.  The skin margins were undermined to an appropriate distance in all directions utilizing iris scissors.
Dermal Autograft Text: The defect edges were debeveled with a #15 scalpel blade.  Given the location of the defect, shape of the defect and the proximity to free margins a dermal autograft was deemed most appropriate.  Using a sterile surgical marker, the primary defect shape was transferred to the donor site. The area thus outlined was incised deep to adipose tissue with a #15 scalpel blade.  The harvested graft was then trimmed of adipose and epidermal tissue until only dermis was left.  The skin graft was then placed in the primary defect and oriented appropriately.
Stage 9: Additional Anesthesia Volume In Cc: 0
Otolaryngologist Procedure Text (D): After obtaining clear surgical margins the patient was sent to otolaryngology for surgical repair.  The patient understands they will receive post-surgical care and follow-up from the referring physician's office.
Mohs Rapid Report Verbiage: The area of clinically evident tumor was marked with skin marking ink and appropriately hatched.  The initial incision was made following the Mohs approach through the skin.  The specimen was taken to the lab, divided into the necessary number of pieces, chromacoded and processed according to the Mohs protocol.  This was repeated in successive stages until a tumor free defect was achieved.
Z Plasty Text: The lesion was extirpated to the level of the fat with a #15 scalpel blade.  Given the location of the defect, shape of the defect and the proximity to free margins a Z-plasty was deemed most appropriate for repair.  Using a sterile surgical marker, the appropriate transposition arms of the Z-plasty were drawn incorporating the defect and placing the expected incisions within the relaxed skin tension lines where possible.    The area thus outlined was incised deep to adipose tissue with a #15 scalpel blade.  The skin margins were undermined to an appropriate distance in all directions utilizing iris scissors.  The opposing transposition arms were then transposed into place in opposite direction and anchored with interrupted buried subcutaneous sutures.
Crescentic Advancement Flap Text: The defect edges were debeveled with a #15 scalpel blade.  Given the location of the defect and the proximity to free margins a crescentic advancement flap was deemed most appropriate.  Using a sterile surgical marker, the appropriate advancement flap was drawn incorporating the defect and placing the expected incisions within the relaxed skin tension lines where possible.    The area thus outlined was incised deep to adipose tissue with a #15 scalpel blade.  The skin margins were undermined to an appropriate distance in all directions utilizing iris scissors.
Simple / Intermediate / Complex Repair - Final Wound Length In Cm: 2.6
Oculoplastic Surgeon Procedure Text (A): After obtaining clear surgical margins the patient was sent to oculoplastics for surgical repair.  The patient understands they will receive post-surgical care and follow-up from the referring physician's office.
Crescentic Intermediate Repair Preamble Text (Leave Blank If You Do Not Want): Undermining was performed with blunt dissection.
Wound Care: Aquaphor
Purse String (Simple) Text: Given the location of the defect and the characteristics of the surrounding skin a purse string closure was deemed most appropriate.  Undermining was performed circumfirentially around the surgical defect.  A purse string suture was then placed and tightened.
Banner Transposition Flap Text: The defect edges were debeveled with a #15 scalpel blade.  Given the location of the defect and the proximity to free margins a Banner transposition flap was deemed most appropriate.  Using a sterile surgical marker, an appropriate flap drawn around the defect. The area thus outlined was incised deep to adipose tissue with a #15 scalpel blade.  The skin margins were undermined to an appropriate distance in all directions utilizing iris scissors.
Provider Procedure Text (D): After obtaining clear surgical margins the defect was repaired by another provider.
Anesthesia Volume In Cc: 6
Alternatives Discussed Intro (Do Not Add Period): I discussed alternative treatments to Mohs surgery and specifically discussed the risks and benefits of
Area L Indication Text: Tumors in this location are included in Area L (trunk and extremities).  Mohs surgery is indicated for larger tumors, or tumors with aggressive histologic features, in these anatomic locations.
Lazy S Complex Repair Preamble Text (Leave Blank If You Do Not Want): Extensive wide undermining was performed.
No Residual Tumor Seen Histology Text: There were no malignant cells seen in the sections examined.
Melolabial Interpolation Flap Text: A decision was made to reconstruct the defect utilizing an interpolation axial flap and a staged reconstruction.  A telfa template was made of the defect.  This telfa template was then used to outline the melolabial interpolation flap.  The donor area for the pedicle flap was then injected with anesthesia.  The flap was excised through the skin and subcutaneous tissue down to the layer of the underlying musculature.  The pedicle flap was carefully excised within this deep plane to maintain its blood supply.  The edges of the donor site were undermined.   The donor site was closed in a primary fashion.  The pedicle was then rotated into position and sutured.  Once the tube was sutured into place, adequate blood supply was confirmed with blanching and refill.  The pedicle was then wrapped with xeroform gauze and dressed appropriately with a telfa and gauze bandage to ensure continued blood supply and protect the attached pedicle.
Epidermal Sutures: 5-0 Caprosyn
V-Y Flap Text: The defect edges were debeveled with a #15 scalpel blade.  Given the location of the defect, shape of the defect and the proximity to free margins a V-Y flap was deemed most appropriate.  Using a sterile surgical marker, an appropriate advancement flap was drawn incorporating the defect and placing the expected incisions within the relaxed skin tension lines where possible.    The area thus outlined was incised deep to adipose tissue with a #15 scalpel blade.  The skin margins were undermined to an appropriate distance in all directions utilizing iris scissors.
Mid-Level Procedure Text (A): After obtaining clear surgical margins the patient was sent to a mid-level provider for surgical repair.  The patient understands they will receive post-surgical care and follow-up from the mid-level provider.
Non-Graft Cartilage Fenestration Text: The cartilage was fenestrated with a 2mm punch biopsy to help facilitate healing.
Localized Dermabrasion With Wire Brush Text: The patient was draped in routine manner.  Localized dermabrasion using 3 x 17 mm wire brush was performed in routine manner to papillary dermis. This spot dermabrasion is being performed to complete skin cancer reconstruction. It also will eliminate the other sun damaged precancerous cells that are known to be part of the regional effect of a lifetime's worth of sun exposure. This localized dermabrasion is therapeutic and should not be considered cosmetic in any regard.
Repair Anesthesia Type: 1% lidocaine with epinephrine
Dorsal Nasal Flap Text: The defect edges were debeveled with a #15 scalpel blade.  Given the location of the defect and the proximity to free margins a dorsal nasal flap was deemed most appropriate.  Using a sterile surgical marker, an appropriate dorsal nasal flap was drawn around the defect.    The area thus outlined was incised deep to adipose tissue with a #15 scalpel blade.  The skin margins were undermined to an appropriate distance in all directions utilizing iris scissors.
Graft Donor Site Epidermal Sutures (Optional): 5-0 Surgipro
Wound Care (No Sutures): Petrolatum
Graft Cartilage Fenestration Text: The cartilage was fenestrated with a 2mm punch biopsy to help facilitate graft survival and healing.
Tarsorrhaphy Text: A tarsorrhaphy was performed using Frost sutures.
Island Pedicle Flap Text: The defect edges were debeveled with a #15 scalpel blade.  Given the location of the defect, shape of the defect and the proximity to free margins an island pedicle advancement flap was deemed most appropriate.  Using a sterile surgical marker, an appropriate advancement flap was drawn incorporating the defect, outlining the appropriate donor tissue and placing the expected incisions within the relaxed skin tension lines where possible.    The area thus outlined was incised deep to adipose tissue with a #15 scalpel blade.  The skin margins were undermined to an appropriate distance in all directions around the primary defect and laterally outward around the island pedicle utilizing iris scissors.  There was minimal undermining beneath the pedicle flap.
Consent (Marginal Mandibular)/Introductory Paragraph: The rationale for Mohs was explained to the patient and consent was obtained. The risks, benefits and alternatives to therapy were discussed in detail. Specifically, the risks of damage to the marginal mandibular branch of the facial nerve, infection, scarring, bleeding, prolonged wound healing, incomplete removal, allergy to anesthesia, and recurrence were addressed. Prior to the procedure, the treatment site was clearly identified and confirmed by the patient. All components of Universal Protocol/PAUSE Rule completed.
Primary Defect Length In Cm (Final Defect Size - Required For Flaps/Grafts): 1.3
Hatchet Flap Text: The defect edges were debeveled with a #15 scalpel blade.  Given the location of the defect, shape of the defect and the proximity to free margins a hatchet flap was deemed most appropriate.  Using a sterile surgical marker, an appropriate hatchet flap was drawn incorporating the defect and placing the expected incisions within the relaxed skin tension lines where possible.    The area thus outlined was incised deep to adipose tissue with a #15 scalpel blade.  The skin margins were undermined to an appropriate distance in all directions utilizing iris scissors.
Cheiloplasty (Complex) Text: A decision was made to reconstruct the defect with a  cheiloplasty.  The defect was undermined extensively.  Additional obicularis oris muscle was excised with a 15 blade scalpel.  The defect was converted into a full thickness wedge to facilite a better cosmetic result.  Small vessels were then tied off with 5-0 monocyrl. The obicularis oris, superficial fascia, adipose and dermis were then reapproximated.  After the deeper layers were approximated the epidermis was reapproximated with particular care given to realign the vermilion border.
Location Indication Override (Is Already Calculated Based On Selected Body Location): Area H
Island Pedicle Flap-Requiring Vessel Identification Text: The defect edges were debeveled with a #15 scalpel blade.  Given the location of the defect, shape of the defect and the proximity to free margins an island pedicle advancement flap was deemed most appropriate.  Using a sterile surgical marker, an appropriate advancement flap was drawn, based on the axial vessel mentioned above, incorporating the defect, outlining the appropriate donor tissue and placing the expected incisions within the relaxed skin tension lines where possible.    The area thus outlined was incised deep to adipose tissue with a #15 scalpel blade.  The skin margins were undermined to an appropriate distance in all directions around the primary defect and laterally outward around the island pedicle utilizing iris scissors.  There was minimal undermining beneath the pedicle flap.
Consent (Nose)/Introductory Paragraph: The rationale for Mohs was explained to the patient and consent was obtained. The risks, benefits and alternatives to therapy were discussed in detail. Specifically, the risks of nasal deformity, changes in the flow of air through the nose, infection, scarring, bleeding, prolonged wound healing, incomplete removal, allergy to anesthesia, nerve injury and recurrence were addressed. Prior to the procedure, the treatment site was clearly identified and confirmed by the patient. All components of Universal Protocol/PAUSE Rule completed.
Plastic Surgeon Procedure Text (C): After obtaining clear surgical margins the patient was sent to plastics for surgical repair.  The patient understands they will receive post-surgical care and follow-up from the referring physician's office.
Transposition Flap Text: The defect edges were debeveled with a #15 scalpel blade.  Given the location of the defect and the proximity to free margins a transposition flap was deemed most appropriate.  Using a sterile surgical marker, an appropriate transposition flap was drawn incorporating the defect.    The area thus outlined was incised deep to adipose tissue with a #15 scalpel blade.  The skin margins were undermined to an appropriate distance in all directions utilizing iris scissors.
Split-Thickness Skin Graft Text: The defect edges were debeveled with a #15 scalpel blade.  Given the location of the defect, shape of the defect and the proximity to free margins a split thickness skin graft was deemed most appropriate.  Using a sterile surgical marker, the primary defect shape was transferred to the donor site. The split thickness graft was then harvested.  The skin graft was then placed in the primary defect and oriented appropriately.
Mohs Histo Method Verbiage: Each section was then chromacoded and processed in the Mohs lab using the Mohs protocol and submitted for frozen section.
S Plasty Text: Given the location and shape of the defect, and the orientation of relaxed skin tension lines, an S-plasty was deemed most appropriate for repair.  Using a sterile surgical marker, the appropriate outline of the S-plasty was drawn, incorporating the defect and placing the expected incisions within the relaxed skin tension lines where possible.  The area thus outlined was incised deep to adipose tissue with a #15 scalpel blade.  The skin margins were undermined to an appropriate distance in all directions utilizing iris scissors. The skin flaps were advanced over the defect.  The opposing margins were then approximated with interrupted buried subcutaneous sutures.
V-Y Plasty Text: The defect edges were debeveled with a #15 scalpel blade.  Given the location of the defect, shape of the defect and the proximity to free margins an V-Y advancement flap was deemed most appropriate.  Using a sterile surgical marker, an appropriate advancement flap was drawn incorporating the defect and placing the expected incisions within the relaxed skin tension lines where possible.    The area thus outlined was incised deep to adipose tissue with a #15 scalpel blade.  The skin margins were undermined to an appropriate distance in all directions utilizing iris scissors.
Advancement Flap (Single) Text: The defect edges were debeveled with a #15 scalpel blade.  Given the location of the defect and the proximity to free margins a single advancement flap was deemed most appropriate.  Using a sterile surgical marker, an appropriate advancement flap was drawn incorporating the defect and placing the expected incisions within the relaxed skin tension lines where possible.    The area thus outlined was incised deep to adipose tissue with a #15 scalpel blade.  The skin margins were undermined to an appropriate distance in all directions utilizing iris scissors.
Skin Substitute Text: The defect edges were debeveled with a #15 scalpel blade.  Given the location of the defect, shape of the defect and the proximity to free margins a skin substitute graft was deemed most appropriate.  The graft material was trimmed to fit the size of the defect. The graft was then placed in the primary defect and oriented appropriately.
Helical Rim Advancement Flap Text: The defect edges were debeveled with a #15 blade scalpel.  Given the location of the defect and the proximity to free margins (helical rim) a double helical rim advancement flap was deemed most appropriate.  Using a sterile surgical marker, the appropriate advancement flaps were drawn incorporating the defect and placing the expected incisions between the helical rim and antihelix where possible.  The area thus outlined was incised through and through with a #15 scalpel blade.  With a skin hook and iris scissors, the flaps were gently and sharply undermined and freed up.
Cheek Interpolation Flap Text: A decision was made to reconstruct the defect utilizing an interpolation axial flap and a staged reconstruction.  A telfa template was made of the defect.  This telfa template was then used to outline the Cheek Interpolation flap.  The donor area for the pedicle flap was then injected with anesthesia.  The flap was excised through the skin and subcutaneous tissue down to the layer of the underlying musculature.  The interpolation flap was carefully excised within this deep plane to maintain its blood supply.  The edges of the donor site were undermined.   The donor site was closed in a primary fashion.  The pedicle was then rotated into position and sutured.  Once the tube was sutured into place, adequate blood supply was confirmed with blanching and refill.  The pedicle was then wrapped with xeroform gauze and dressed appropriately with a telfa and gauze bandage to ensure continued blood supply and protect the attached pedicle.
A-T Advancement Flap Text: The defect edges were debeveled with a #15 scalpel blade.  Given the location of the defect, shape of the defect and the proximity to free margins an A-T advancement flap was deemed most appropriate.  Using a sterile surgical marker, an appropriate advancement flap was drawn incorporating the defect and placing the expected incisions within the relaxed skin tension lines where possible.    The area thus outlined was incised deep to adipose tissue with a #15 scalpel blade.  The skin margins were undermined to an appropriate distance in all directions utilizing iris scissors.
Purse String (Intermediate) Text: Given the location of the defect and the characteristics of the surrounding skin a purse string intermediate closure was deemed most appropriate.  Undermining was performed circumfirentially around the surgical defect.  A purse string suture was then placed and tightened.
Bilobed Flap Text: The defect edges were debeveled with a #15 scalpel blade.  Given the location of the defect and the proximity to free margins a bilobe flap was deemed most appropriate.  Using a sterile surgical marker, an appropriate bilobe flap drawn around the defect.    The area thus outlined was incised deep to adipose tissue with a #15 scalpel blade.  The skin margins were undermined to an appropriate distance in all directions utilizing iris scissors.
Consent 1/Introductory Paragraph: The rationale for Mohs was explained to the patient and consent was obtained. The risks, benefits and alternatives to therapy were discussed in detail. Specifically, the risks of infection, scarring, bleeding, prolonged wound healing, incomplete removal, allergy to anesthesia, nerve injury and recurrence were addressed. Prior to the procedure, the treatment site was clearly identified and confirmed by the patient. All components of Universal Protocol/PAUSE Rule completed.
Surgeon Performing Repair (Optional): Janet Ortiz MD
Mastoid Interpolation Flap Text: A decision was made to reconstruct the defect utilizing an interpolation axial flap and a staged reconstruction.  A telfa template was made of the defect.  This telfa template was then used to outline the mastoid interpolation flap.  The donor area for the pedicle flap was then injected with anesthesia.  The flap was excised through the skin and subcutaneous tissue down to the layer of the underlying musculature.  The pedicle flap was carefully excised within this deep plane to maintain its blood supply.  The edges of the donor site were undermined.   The donor site was closed in a primary fashion.  The pedicle was then rotated into position and sutured.  Once the tube was sutured into place, adequate blood supply was confirmed with blanching and refill.  The pedicle was then wrapped with xeroform gauze and dressed appropriately with a telfa and gauze bandage to ensure continued blood supply and protect the attached pedicle.
Inflammation Suggestive Of Cancer Camouflage Histology Text: There was a dense lymphocytic infiltrate which prevented adequate histologic evaluation of adjacent structures.
Advancement-Rotation Flap Text: The defect edges were debeveled with a #15 scalpel blade.  Given the location of the defect, shape of the defect and the proximity to free margins an advancement-rotation flap was deemed most appropriate.  Using a sterile surgical marker, an appropriate flap was drawn incorporating the defect and placing the expected incisions within the relaxed skin tension lines where possible. The area thus outlined was incised deep to adipose tissue with a #15 scalpel blade.  The skin margins were undermined to an appropriate distance in all directions utilizing iris scissors.
Mohs Case Number: [id number]
Secondary Intention Text (Leave Blank If You Do Not Want): The defect will heal with secondary intention.
Complex Repair And Flap Additional Text (Will Appearing After The Standard Complex Repair Text): The complex repair was not sufficient to completely close the primary defect. The remaining additional defect was repaired with the flap mentioned below.
Island Pedicle Flap With Canthal Suspension Text: The defect edges were debeveled with a #15 scalpel blade.  Given the location of the defect, shape of the defect and the proximity to free margins an island pedicle advancement flap was deemed most appropriate.  Using a sterile surgical marker, an appropriate advancement flap was drawn incorporating the defect, outlining the appropriate donor tissue and placing the expected incisions within the relaxed skin tension lines where possible. The area thus outlined was incised deep to adipose tissue with a #15 scalpel blade.  The skin margins were undermined to an appropriate distance in all directions around the primary defect and laterally outward around the island pedicle utilizing iris scissors.  There was minimal undermining beneath the pedicle flap. A suspension suture was placed in the canthal tendon to prevent tension and prevent ectropion.
Epidermal Closure Graft Donor Site (Optional): running
Estimated Blood Loss (Cc): minimal
Primary Defect Width In Cm (Final Defect Size - Required For Flaps/Grafts): 1.2
Consent (Spinal Accessory)/Introductory Paragraph: The rationale for Mohs was explained to the patient and consent was obtained. The risks, benefits and alternatives to therapy were discussed in detail. Specifically, the risks of damage to the spinal accessory nerve, infection, scarring, bleeding, prolonged wound healing, incomplete removal, allergy to anesthesia, and recurrence were addressed. Prior to the procedure, the treatment site was clearly identified and confirmed by the patient. All components of Universal Protocol/PAUSE Rule completed.
Epidermal Closure: simple interrupted
Rotation Flap Text: The defect edges were debeveled with a #15 scalpel blade.  Given the location of the defect, shape of the defect and the proximity to free margins a rotation flap was deemed most appropriate.  Using a sterile surgical marker, an appropriate rotation flap was drawn incorporating the defect and placing the expected incisions within the relaxed skin tension lines where possible.    The area thus outlined was incised deep to adipose tissue with a #15 scalpel blade.  The skin margins were undermined to an appropriate distance in all directions utilizing iris scissors.
Ear Wedge Repair Text: A wedge excision was completed by carrying down an excision through the full thickness of the ear and cartilage with an inward facing Burow's triangle. The wound was then closed in a layered fashion.
Eye Protection Verbiage: Before proceeding with the stage, a plastic scleral shield was inserted. The globe was anesthetized with a few drops of 1% lidocaine with 1:100,000 epinephrine. Then, an appropriate sized scleral shield was chosen and coated with lacrilube ointment. The shield was gently inserted and left in place for the duration of each stage. After the stage was completed, the shield was gently removed.
Keystone Flap Text: The defect edges were debeveled with a #15 scalpel blade.  Given the location of the defect, shape of the defect a keystone flap was deemed most appropriate.  Using a sterile surgical marker, an appropriate keystone flap was drawn incorporating the defect, outlining the appropriate donor tissue and placing the expected incisions within the relaxed skin tension lines where possible. The area thus outlined was incised deep to adipose tissue with a #15 scalpel blade.  The skin margins were undermined to an appropriate distance in all directions around the primary defect and laterally outward around the flap utilizing iris scissors.
Closure 2 Information: This tab is for additional flaps and grafts, including complex repair and grafts and complex repair and flaps. You can also specify a different location for the additional defect, if the location is the same you do not need to select a new one. We will insert the automated text for the repair you select below just as we do for solitary flaps and grafts. Please note that at this time if you select a location with a different insurance zone you will need to override the ICD10 and CPT if appropriate.
Consent (Lip)/Introductory Paragraph: The rationale for Mohs was explained to the patient and consent was obtained. The risks, benefits and alternatives to therapy were discussed in detail. Specifically, the risks of lip deformity, changes in the oral aperture, infection, scarring, bleeding, prolonged wound healing, incomplete removal, allergy to anesthesia, nerve injury and recurrence were addressed. Prior to the procedure, the treatment site was clearly identified and confirmed by the patient. All components of Universal Protocol/PAUSE Rule completed.
Muscle Hinge Flap Text: The defect edges were debeveled with a #15 scalpel blade.  Given the size, depth and location of the defect and the proximity to free margins a muscle hinge flap was deemed most appropriate.  Using a sterile surgical marker, an appropriate hinge flap was drawn incorporating the defect. The area thus outlined was incised with a #15 scalpel blade.  The skin margins were undermined to an appropriate distance in all directions utilizing iris scissors.
Cartilage Graft Text: The defect edges were debeveled with a #15 scalpel blade.  Given the location of the defect, shape of the defect, the fact the defect involved a full thickness cartilage defect a cartilage graft was deemed most appropriate.  An appropriate donor site was identified, cleansed, and anesthetized. The cartilage graft was then harvested and transferred to the recipient site, oriented appropriately and then sutured into place.  The secondary defect was then repaired using a primary closure.
Referring Physician (Optional): Dr. India Monique
Unna Boot Text: An Unna boot was placed to help immobilize the limb and facilitate more rapid healing.
H Plasty Text: Given the location of the defect, shape of the defect and the proximity to free margins a H-plasty was deemed most appropriate for repair.  Using a sterile surgical marker, the appropriate advancement arms of the H-plasty were drawn incorporating the defect and placing the expected incisions within the relaxed skin tension lines where possible. The area thus outlined was incised deep to adipose tissue with a #15 scalpel blade. The skin margins were undermined to an appropriate distance in all directions utilizing iris scissors.  The opposing advancement arms were then advanced into place in opposite direction and anchored with interrupted buried subcutaneous sutures.
Advancement Flap (Double) Text: The defect edges were debeveled with a #15 scalpel blade.  Given the location of the defect and the proximity to free margins a double advancement flap was deemed most appropriate.  Using a sterile surgical marker, the appropriate advancement flaps were drawn incorporating the defect and placing the expected incisions within the relaxed skin tension lines where possible.    The area thus outlined was incised deep to adipose tissue with a #15 scalpel blade.  The skin margins were undermined to an appropriate distance in all directions utilizing iris scissors.
Deep Sutures: 5-0 Polysorb
Tissue Cultured Epidermal Autograft Text: The defect edges were debeveled with a #15 scalpel blade.  Given the location of the defect, shape of the defect and the proximity to free margins a tissue cultured epidermal autograft was deemed most appropriate.  The graft was then trimmed to fit the size of the defect.  The graft was then placed in the primary defect and oriented appropriately.
Bilateral Helical Rim Advancement Flap Text: The defect edges were debeveled with a #15 blade scalpel.  Given the location of the defect and the proximity to free margins (helical rim) a bilateral helical rim advancement flap was deemed most appropriate.  Using a sterile surgical marker, the appropriate advancement flaps were drawn incorporating the defect and placing the expected incisions between the helical rim and antihelix where possible.  The area thus outlined was incised through and through with a #15 scalpel blade.  With a skin hook and iris scissors, the flaps were gently and sharply undermined and freed up.
Area H Indication Text: Tumors in this location are included in Area H (eyelids, eyebrows, nose, lips, chin, ear, pre-auricular, post-auricular, temple, genitalia, hands, feet, ankles and areola).  Tissue conservation is critical in these anatomic locations.
Post-Care Instructions: I reviewed with the patient in detail post-care instructions. Patient is not to engage in any heavy lifting, exercise, or swimming for the next 14 days. Should the patient develop any fevers, chills, bleeding, severe pain patient will contact the office immediately.
Cheek-To-Nose Interpolation Flap Text: A decision was made to reconstruct the defect utilizing an interpolation axial flap and a staged reconstruction.  A telfa template was made of the defect.  This telfa template was then used to outline the Cheek-To-Nose Interpolation flap.  The donor area for the pedicle flap was then injected with anesthesia.  The flap was excised through the skin and subcutaneous tissue down to the layer of the underlying musculature.  The interpolation flap was carefully excised within this deep plane to maintain its blood supply.  The edges of the donor site were undermined.   The donor site was closed in a primary fashion.  The pedicle was then rotated into position and sutured.  Once the tube was sutured into place, adequate blood supply was confirmed with blanching and refill.  The pedicle was then wrapped with xeroform gauze and dressed appropriately with a telfa and gauze bandage to ensure continued blood supply and protect the attached pedicle.
O-T Advancement Flap Text: The defect edges were debeveled with a #15 scalpel blade.  Given the location of the defect, shape of the defect and the proximity to free margins an O-T advancement flap was deemed most appropriate.  Using a sterile surgical marker, an appropriate advancement flap was drawn incorporating the defect and placing the expected incisions within the relaxed skin tension lines where possible.    The area thus outlined was incised deep to adipose tissue with a #15 scalpel blade.  The skin margins were undermined to an appropriate distance in all directions utilizing iris scissors.
Postop Diagnosis: same
Dressing: pressure dressing with telfa
Partial Purse String (Simple) Text: Given the location of the defect and the characteristics of the surrounding skin a simple purse string closure was deemed most appropriate.  Undermining was performed circumfirentially around the surgical defect.  A purse string suture was then placed and tightened. Wound tension only allowed a partial closure of the circular defect.
Bilobed Transposition Flap Text: The defect edges were debeveled with a #15 scalpel blade.  Given the location of the defect and the proximity to free margins a bilobed transposition flap was deemed most appropriate.  Using a sterile surgical marker, an appropriate bilobe flap drawn around the defect.    The area thus outlined was incised deep to adipose tissue with a #15 scalpel blade.  The skin margins were undermined to an appropriate distance in all directions utilizing iris scissors.
Consent 2/Introductory Paragraph: Mohs surgery was explained to the patient and consent was obtained. The risks, benefits and alternatives to therapy were discussed in detail. Specifically, the risks of infection, scarring, bleeding, prolonged wound healing, incomplete removal, allergy to anesthesia, nerve injury and recurrence were addressed. Prior to the procedure, the treatment site was clearly identified and confirmed by the patient. All components of Universal Protocol/PAUSE Rule completed.
Initial Size Of Lesion: 0.8
Posterior Auricular Interpolation Flap Text: A decision was made to reconstruct the defect utilizing an interpolation axial flap and a staged reconstruction.  A telfa template was made of the defect.  This telfa template was then used to outline the posterior auricular interpolation flap.  The donor area for the pedicle flap was then injected with anesthesia.  The flap was excised through the skin and subcutaneous tissue down to the layer of the underlying musculature.  The pedicle flap was carefully excised within this deep plane to maintain its blood supply.  The edges of the donor site were undermined.   The donor site was closed in a primary fashion.  The pedicle was then rotated into position and sutured.  Once the tube was sutured into place, adequate blood supply was confirmed with blanching and refill.  The pedicle was then wrapped with xeroform gauze and dressed appropriately with a telfa and gauze bandage to ensure continued blood supply and protect the attached pedicle.
Mercedes Flap Text: The defect edges were debeveled with a #15 scalpel blade.  Given the location of the defect, shape of the defect and the proximity to free margins a Mercedes flap was deemed most appropriate.  Using a sterile surgical marker, an appropriate advancement flap was drawn incorporating the defect and placing the expected incisions within the relaxed skin tension lines where possible. The area thus outlined was incised deep to adipose tissue with a #15 scalpel blade.  The skin margins were undermined to an appropriate distance in all directions utilizing iris scissors.
Mauc Instructions: By selecting yes to the question below the MAUC number will be added into the note.  This will be calculated automatically based on the diagnosis chosen, the size entered, the body zone selected (H,M,L) and the specific indications you chose. You will also have the option to override the Mohs AUC if you disagree with the automatically calculated number and this option is found in the Case Summary tab.
Paramedian Forehead Flap Text: A decision was made to reconstruct the defect utilizing an interpolation axial flap and a staged reconstruction.  A telfa template was made of the defect.  This telfa template was then used to outline the paramedian forehead pedicle flap.  The donor area for the pedicle flap was then injected with anesthesia.  The flap was excised through the skin and subcutaneous tissue down to the layer of the underlying musculature.  The pedicle flap was carefully excised within this deep plane to maintain its blood supply.  The edges of the donor site were undermined.   The donor site was closed in a primary fashion.  The pedicle was then rotated into position and sutured.  Once the tube was sutured into place, adequate blood supply was confirmed with blanching and refill.  The pedicle was then wrapped with xeroform gauze and dressed appropriately with a telfa and gauze bandage to ensure continued blood supply and protect the attached pedicle.
Modified Advancement Flap Text: The defect edges were debeveled with a #15 scalpel blade.  Given the location of the defect, shape of the defect and the proximity to free margins a modified advancement flap was deemed most appropriate.  Using a sterile surgical marker, an appropriate advancement flap was drawn incorporating the defect and placing the expected incisions within the relaxed skin tension lines where possible.    The area thus outlined was incised deep to adipose tissue with a #15 scalpel blade.  The skin margins were undermined to an appropriate distance in all directions utilizing iris scissors.
No Repair - Repaired With Adjacent Surgical Defect Text (Leave Blank If You Do Not Want): After obtaining clear surgical margins the defect was repaired concurrently with another surgical defect which was in close approximation.
Complex Repair And Graft Additional Text (Will Appearing After The Standard Complex Repair Text): The complex repair was not sufficient to completely close the primary defect. The remaining additional defect was repaired with the graft mentioned below.
Dressing (No Sutures): dry sterile dressing
Alar Island Pedicle Flap Text: The defect edges were debeveled with a #15 scalpel blade.  Given the location of the defect, shape of the defect and the proximity to the alar rim an island pedicle advancement flap was deemed most appropriate.  Using a sterile surgical marker, an appropriate advancement flap was drawn incorporating the defect, outlining the appropriate donor tissue and placing the expected incisions within the nasal ala running parallel to the alar rim. The area thus outlined was incised with a #15 scalpel blade.  The skin margins were undermined minimally to an appropriate distance in all directions around the primary defect and laterally outward around the island pedicle utilizing iris scissors.  There was minimal undermining beneath the pedicle flap.
Consent (Near Eyelid Margin)/Introductory Paragraph: The rationale for Mohs was explained to the patient and consent was obtained. The risks, benefits and alternatives to therapy were discussed in detail. Specifically, the risks of ectropion or eyelid deformity, infection, scarring, bleeding, prolonged wound healing, incomplete removal, allergy to anesthesia, nerve injury and recurrence were addressed. Prior to the procedure, the treatment site was clearly identified and confirmed by the patient. All components of Universal Protocol/PAUSE Rule completed.
Repair Type: Complex Repair
Spiral Flap Text: The defect edges were debeveled with a #15 scalpel blade.  Given the location of the defect, shape of the defect and the proximity to free margins a spiral flap was deemed most appropriate.  Using a sterile surgical marker, an appropriate rotation flap was drawn incorporating the defect and placing the expected incisions within the relaxed skin tension lines where possible. The area thus outlined was incised deep to adipose tissue with a #15 scalpel blade.  The skin margins were undermined to an appropriate distance in all directions utilizing iris scissors.
Bcc Histology Text: There were numerous aggregates of basaloid cells.
Full Thickness Lip Wedge Repair (Flap) Text: Given the location of the defect and the proximity to free margins a full thickness wedge repair was deemed most appropriate.  Using a sterile surgical marker, the appropriate repair was drawn incorporating the defect and placing the expected incisions perpendicular to the vermilion border.  The vermilion border was also meticulously outlined to ensure appropriate reapproximation during the repair.  The area thus outlined was incised through and through with a #15 scalpel blade.  The muscularis and dermis were reaproximated with deep sutures following hemostasis. Care was taken to realign the vermilion border before proceeding with the superficial closure.  Once the vermilion was realigned the superfical and mucosal closure was finished.
Mohs Method Verbiage: An incision at a 45 degree angle following the standard Mohs approach was done and the specimen was harvested as a microscopic controlled layer.
Closure 4 Information: This tab is for additional flaps and grafts above and beyond our usual structured repairs.  Please note if you enter information here it will not currently bill and you will need to add the billing information manually.
O-T Plasty Text: The defect edges were debeveled with a #15 scalpel blade.  Given the location of the defect, shape of the defect and the proximity to free margins an O-T plasty was deemed most appropriate.  Using a sterile surgical marker, an appropriate O-T plasty was drawn incorporating the defect and placing the expected incisions within the relaxed skin tension lines where possible.    The area thus outlined was incised deep to adipose tissue with a #15 scalpel blade.  The skin margins were undermined to an appropriate distance in all directions utilizing iris scissors.
Consent (Scalp)/Introductory Paragraph: The rationale for Mohs was explained to the patient and consent was obtained. The risks, benefits and alternatives to therapy were discussed in detail. Specifically, the risks of changes in hair growth pattern secondary to repair, infection, scarring, bleeding, prolonged wound healing, incomplete removal, allergy to anesthesia, nerve injury and recurrence were addressed. Prior to the procedure, the treatment site was clearly identified and confirmed by the patient. All components of Universal Protocol/PAUSE Rule completed.
Melolabial Transposition Flap Text: The defect edges were debeveled with a #15 scalpel blade.  Given the location of the defect and the proximity to free margins a melolabial flap was deemed most appropriate.  Using a sterile surgical marker, an appropriate melolabial transposition flap was drawn incorporating the defect.    The area thus outlined was incised deep to adipose tissue with a #15 scalpel blade.  The skin margins were undermined to an appropriate distance in all directions utilizing iris scissors.
Composite Graft Text: The defect edges were debeveled with a #15 scalpel blade.  Given the location of the defect, shape of the defect, the proximity to free margins and the fact the defect was full thickness a composite graft was deemed most appropriate.  The defect was outline and then transferred to the donor site.  A full thickness graft was then excised from the donor site. The graft was then placed in the primary defect, oriented appropriately and then sutured into place.  The secondary defect was then repaired using a primary closure.
Donor Site Anesthesia Type: same as repair anesthesia
Epidermal Autograft Text: The defect edges were debeveled with a #15 scalpel blade.  Given the location of the defect, shape of the defect and the proximity to free margins an epidermal autograft was deemed most appropriate.  Using a sterile surgical marker, the primary defect shape was transferred to the donor site. The epidermal graft was then harvested.  The skin graft was then placed in the primary defect and oriented appropriately.
Consent Type: Consent 1 (Standard)
Manual Repair Warning Statement: We plan on removing the manually selected variable below in favor of our much easier automatic structured text blocks found in the previous tab. We decided to do this to help make the flow better and give you the full power of structured data. Manual selection is never going to be ideal in our platform and I would encourage you to avoid using manual selection from this point on, especially since I will be sunsetting this feature. It is important that you do one of two things with the customized text below. First, you can save all of the text in a word file so you can have it for future reference. Second, transfer the text to the appropriate area in the Library tab. Lastly, if there is a flap or graft type which we do not have you need to let us know right away so I can add it in before the variable is hidden. No need to panic, we plan to give you roughly 6 months to make the change.
Subsequent Stages Histo Method Verbiage: Using a similar technique to that described above, a thin layer of tissue was removed from all areas where tumor was visible on the previous stage.  The tissue was again oriented, mapped, dyed, and processed as above.
Home Suture Removal Text: Patient was provided instructions on removing sutures and will remove their sutures at home.  If they have any questions or difficulties they will call the office.
W Plasty Text: The lesion was extirpated to the level of the fat with a #15 scalpel blade.  Given the location of the defect, shape of the defect and the proximity to free margins a W-plasty was deemed most appropriate for repair.  Using a sterile surgical marker, the appropriate transposition arms of the W-plasty were drawn incorporating the defect and placing the expected incisions within the relaxed skin tension lines where possible.    The area thus outlined was incised deep to adipose tissue with a #15 scalpel blade.  The skin margins were undermined to an appropriate distance in all directions utilizing iris scissors.  The opposing transposition arms were then transposed into place in opposite direction and anchored with interrupted buried subcutaneous sutures.
Burow's Advancement Flap Text: The defect edges were debeveled with a #15 scalpel blade.  Given the location of the defect and the proximity to free margins a Burow's advancement flap was deemed most appropriate.  Using a sterile surgical marker, the appropriate advancement flap was drawn incorporating the defect and placing the expected incisions within the relaxed skin tension lines where possible.    The area thus outlined was incised deep to adipose tissue with a #15 scalpel blade.  The skin margins were undermined to an appropriate distance in all directions utilizing iris scissors.
Xenograft Text: The defect edges were debeveled with a #15 scalpel blade.  Given the location of the defect, shape of the defect and the proximity to free margins a xenograft was deemed most appropriate.  The graft was then trimmed to fit the size of the defect.  The graft was then placed in the primary defect and oriented appropriately.
Ear Star Wedge Flap Text: The defect edges were debeveled with a #15 blade scalpel.  Given the location of the defect and the proximity to free margins (helical rim) an ear star wedge flap was deemed most appropriate.  Using a sterile surgical marker, the appropriate flap was drawn incorporating the defect and placing the expected incisions between the helical rim and antihelix where possible.  The area thus outlined was incised through and through with a #15 scalpel blade.
Medical Necessity Statement: Based on my medical judgement, Mohs surgery is the most appropriate treatment for this cancer compared to other treatments.
Area M Indication Text: Tumors in this location are included in Area M (cheek, forehead, scalp, neck, jawline and pretibial skin).  Mohs surgery is indicated for tumors in these anatomic locations.
Same Histology In Subsequent Stages Text: The pattern and morphology of the tumor is as described in the first stage.
Information: Selecting Yes will display possible errors in your note based on the variables you have selected. This validation is only offered as a suggestion for you. PLEASE NOTE THAT THE VALIDATION TEXT WILL BE REMOVED WHEN YOU FINALIZE YOUR NOTE. IF YOU WANT TO FAX A PRELIMINARY NOTE YOU WILL NEED TO TOGGLE THIS TO 'NO' IF YOU DO NOT WANT IT IN YOUR FAXED NOTE.
Interpolation Flap Text: A decision was made to reconstruct the defect utilizing an interpolation axial flap and a staged reconstruction.  A telfa template was made of the defect.  This telfa template was then used to outline the interpolation flap.  The donor area for the pedicle flap was then injected with anesthesia.  The flap was excised through the skin and subcutaneous tissue down to the layer of the underlying musculature.  The interpolation flap was carefully excised within this deep plane to maintain its blood supply.  The edges of the donor site were undermined.   The donor site was closed in a primary fashion.  The pedicle was then rotated into position and sutured.  Once the tube was sutured into place, adequate blood supply was confirmed with blanching and refill.  The pedicle was then wrapped with xeroform gauze and dressed appropriately with a telfa and gauze bandage to ensure continued blood supply and protect the attached pedicle.
O-L Flap Text: The defect edges were debeveled with a #15 scalpel blade.  Given the location of the defect, shape of the defect and the proximity to free margins an O-L flap was deemed most appropriate.  Using a sterile surgical marker, an appropriate advancement flap was drawn incorporating the defect and placing the expected incisions within the relaxed skin tension lines where possible.    The area thus outlined was incised deep to adipose tissue with a #15 scalpel blade.  The skin margins were undermined to an appropriate distance in all directions utilizing iris scissors.
Partial Purse String (Intermediate) Text: Given the location of the defect and the characteristics of the surrounding skin an intermediate purse string closure was deemed most appropriate.  Undermining was performed circumfirentially around the surgical defect.  A purse string suture was then placed and tightened. Wound tension only allowed a partial closure of the circular defect.
Trilobed Flap Text: The defect edges were debeveled with a #15 scalpel blade.  Given the location of the defect and the proximity to free margins a trilobed flap was deemed most appropriate.  Using a sterile surgical marker, an appropriate trilobed flap drawn around the defect.    The area thus outlined was incised deep to adipose tissue with a #15 scalpel blade.  The skin margins were undermined to an appropriate distance in all directions utilizing iris scissors.
Consent 3/Introductory Paragraph: I gave the patient a chance to ask questions they had about the procedure.  Following this I explained the Mohs procedure and consent was obtained. The risks, benefits and alternatives to therapy were discussed in detail. Specifically, the risks of infection, scarring, bleeding, prolonged wound healing, incomplete removal, allergy to anesthesia, nerve injury and recurrence were addressed. Prior to the procedure, the treatment site was clearly identified and confirmed by the patient. All components of Universal Protocol/PAUSE Rule completed.
Consent (Temporal Branch)/Introductory Paragraph: The rationale for Mohs was explained to the patient and consent was obtained. The risks, benefits and alternatives to therapy were discussed in detail. Specifically, the risks of damage to the temporal branch of the facial nerve, infection, scarring, bleeding, prolonged wound healing, incomplete removal, allergy to anesthesia, and recurrence were addressed. Prior to the procedure, the treatment site was clearly identified and confirmed by the patient. All components of Universal Protocol/PAUSE Rule completed.
Hemostasis: Electrocautery
Number Of Stages: 2
Cheiloplasty (Less Than 50%) Text: A decision was made to reconstruct the defect with a  cheiloplasty.  The defect was undermined extensively.  Additional obicularis oris muscle was excised with a 15 blade scalpel.  The defect was converted into a full thickness wedge, of less than 50% of the vertical height of the lip, to facilite a better cosmetic result.  Small vessels were then tied off with 5-0 monocyrl. The obicularis oris, superficial fascia, adipose and dermis were then reapproximated.  After the deeper layers were approximated the epidermis was reapproximated with particular care given to realign the vermilion border.
Mucosal Advancement Flap Text: Given the location of the defect, shape of the defect and the proximity to free margins a mucosal advancement flap was deemed most appropriate. Incisions were made with a 15 blade scalpel in the appropriate fashion along the cutaneous vermilion border and the mucosal lip. The remaining actinically damaged mucosal tissue was excised.  The mucosal advancement flap was then elevated to the gingival sulcus with care taken to preserve the neurovascular structures and advanced into the primary defect. Care was taken to ensure that precise realignment of the vermilion border was achieved.
Graft Donor Site Bandage (Optional-Leave Blank If You Don't Want In Note): Aquaplast was fitted to the graft site and sewn into place. A pressure bandage were applied to the donor site and over the aquaplast bolster.
Double Island Pedicle Flap Text: The defect edges were debeveled with a #15 scalpel blade.  Given the location of the defect, shape of the defect and the proximity to free margins a double island pedicle advancement flap was deemed most appropriate.  Using a sterile surgical marker, an appropriate advancement flap was drawn incorporating the defect, outlining the appropriate donor tissue and placing the expected incisions within the relaxed skin tension lines where possible.    The area thus outlined was incised deep to adipose tissue with a #15 scalpel blade.  The skin margins were undermined to an appropriate distance in all directions around the primary defect and laterally outward around the island pedicle utilizing iris scissors.  There was minimal undermining beneath the pedicle flap.
Consent (Ear)/Introductory Paragraph: The rationale for Mohs was explained to the patient and consent was obtained. The risks, benefits and alternatives to therapy were discussed in detail. Specifically, the risks of ear deformity, infection, scarring, bleeding, prolonged wound healing, incomplete removal, allergy to anesthesia, nerve injury and recurrence were addressed. Prior to the procedure, the treatment site was clearly identified and confirmed by the patient. All components of Universal Protocol/PAUSE Rule completed.
Star Wedge Flap Text: The defect edges were debeveled with a #15 scalpel blade.  Given the location of the defect, shape of the defect and the proximity to free margins a star wedge flap was deemed most appropriate.  Using a sterile surgical marker, an appropriate rotation flap was drawn incorporating the defect and placing the expected incisions within the relaxed skin tension lines where possible. The area thus outlined was incised deep to adipose tissue with a #15 scalpel blade.  The skin margins were undermined to an appropriate distance in all directions utilizing iris scissors.
Bcc Infiltrative Histology Text: There were numerous aggregates of basaloid cells demonstrating an infiltrative pattern.
Ftsg Text: The defect edges were debeveled with a #15 scalpel blade.  Given the location of the defect, shape of the defect and the proximity to free margins a full thickness skin graft was deemed most appropriate.  Using a sterile surgical marker, the primary defect shape was transferred to the donor site. The area thus outlined was incised deep to adipose tissue with a #15 scalpel blade.  The harvested graft was then trimmed of adipose tissue until only dermis and epidermis was left.  The skin margins of the secondary defect were undermined to an appropriate distance in all directions utilizing iris scissors.  The secondary defect was closed with interrupted buried subcutaneous sutures.  The skin edges were then re-apposed with running  sutures.  The skin graft was then placed in the primary defect and oriented appropriately.
Previous Accession (Optional): [id number]
Surgeon/Pathologist Verbiage (Will Incorporate Name Of Surgeon From Intro If Not Blank): operated in two distinct and integrated capacities as the surgeon and pathologist.
O-Z Plasty Text: The defect edges were debeveled with a #15 scalpel blade.  Given the location of the defect, shape of the defect and the proximity to free margins an O-Z plasty (double transposition flap) was deemed most appropriate.  Using a sterile surgical marker, the appropriate transposition flaps were drawn incorporating the defect and placing the expected incisions within the relaxed skin tension lines where possible.    The area thus outlined was incised deep to adipose tissue with a #15 scalpel blade.  The skin margins were undermined to an appropriate distance in all directions utilizing iris scissors.  Hemostasis was achieved with electrocautery.  The flaps were then transposed into place, one clockwise and the other counterclockwise, and anchored with interrupted buried subcutaneous sutures.
Detail Level: Detailed
Rhombic Flap Text: The defect edges were debeveled with a #15 scalpel blade.  Given the location of the defect and the proximity to free margins a rhombic flap was deemed most appropriate.  Using a sterile surgical marker, an appropriate rhombic flap was drawn incorporating the defect.    The area thus outlined was incised deep to adipose tissue with a #15 scalpel blade.  The skin margins were undermined to an appropriate distance in all directions utilizing iris scissors.

## 2019-03-21 LAB
ALBUMIN SERPL-MCNC: 4.6 G/DL (ref 3.6–4.8)
ALBUMIN/GLOB SERPL: 1.8 {RATIO} (ref 1.2–2.2)
ALP SERPL-CCNC: 51 IU/L (ref 39–117)
ALT SERPL-CCNC: 39 IU/L (ref 0–44)
AST SERPL-CCNC: 33 IU/L (ref 0–40)
BASOPHILS # BLD AUTO: 0.1 X10E3/UL (ref 0–0.2)
BASOPHILS NFR BLD AUTO: 1 %
BILIRUB SERPL-MCNC: 0.4 MG/DL (ref 0–1.2)
BUN SERPL-MCNC: 26 MG/DL (ref 8–27)
BUN/CREAT SERPL: 23 (ref 10–24)
CALCIUM SERPL-MCNC: 10.2 MG/DL (ref 8.6–10.2)
CHLORIDE SERPL-SCNC: 108 MMOL/L (ref 96–106)
CHOLEST SERPL-MCNC: 204 MG/DL (ref 100–199)
CO2 SERPL-SCNC: 24 MMOL/L (ref 20–29)
CREAT SERPL-MCNC: 1.15 MG/DL (ref 0.76–1.27)
EOSINOPHIL # BLD AUTO: 0.2 X10E3/UL (ref 0–0.4)
EOSINOPHIL NFR BLD AUTO: 2 %
ERYTHROCYTE [DISTWIDTH] IN BLOOD BY AUTOMATED COUNT: 14 % (ref 12.3–15.4)
GLOBULIN SER CALC-MCNC: 2.5 G/DL (ref 1.5–4.5)
GLUCOSE SERPL-MCNC: 111 MG/DL (ref 65–99)
HCT VFR BLD AUTO: 48.8 % (ref 37.5–51)
HDLC SERPL-MCNC: 70 MG/DL
HGB BLD-MCNC: 16.4 G/DL (ref 13–17.7)
IMM GRANULOCYTES # BLD AUTO: 0.1 X10E3/UL (ref 0–0.1)
IMM GRANULOCYTES NFR BLD AUTO: 1 %
IMMATURE CELLS  115398: NORMAL
LABORATORY COMMENT REPORT: ABNORMAL
LDLC SERPL CALC-MCNC: 111 MG/DL (ref 0–99)
LYMPHOCYTES # BLD AUTO: 2.9 X10E3/UL (ref 0.7–3.1)
LYMPHOCYTES NFR BLD AUTO: 29 %
MCH RBC QN AUTO: 32.2 PG (ref 26.6–33)
MCHC RBC AUTO-ENTMCNC: 33.6 G/DL (ref 31.5–35.7)
MCV RBC AUTO: 96 FL (ref 79–97)
MONOCYTES # BLD AUTO: 0.8 X10E3/UL (ref 0.1–0.9)
MONOCYTES NFR BLD AUTO: 8 %
MORPHOLOGY BLD-IMP: NORMAL
NEUTROPHILS # BLD AUTO: 6 X10E3/UL (ref 1.4–7)
NEUTROPHILS NFR BLD AUTO: 59 %
NRBC BLD AUTO-RTO: NORMAL %
PLATELET # BLD AUTO: 212 X10E3/UL (ref 150–379)
POTASSIUM SERPL-SCNC: 5.1 MMOL/L (ref 3.5–5.2)
PROT SERPL-MCNC: 7.1 G/DL (ref 6–8.5)
RBC # BLD AUTO: 5.09 X10E6/UL (ref 4.14–5.8)
SODIUM SERPL-SCNC: 144 MMOL/L (ref 134–144)
TRIGL SERPL-MCNC: 116 MG/DL (ref 0–149)
VLDLC SERPL CALC-MCNC: 23 MG/DL (ref 5–40)
WBC # BLD AUTO: 9.9 X10E3/UL (ref 3.4–10.8)

## 2019-03-26 ENCOUNTER — HOSPITAL ENCOUNTER (OUTPATIENT)
Dept: RADIOLOGY | Facility: MEDICAL CENTER | Age: 64
End: 2019-03-26
Attending: INTERNAL MEDICINE
Payer: COMMERCIAL

## 2019-03-26 ENCOUNTER — OFFICE VISIT (OUTPATIENT)
Dept: MEDICAL GROUP | Age: 64
End: 2019-03-26
Payer: COMMERCIAL

## 2019-03-26 ENCOUNTER — HOSPITAL ENCOUNTER (OUTPATIENT)
Dept: LAB | Facility: MEDICAL CENTER | Age: 64
End: 2019-03-26
Attending: INTERNAL MEDICINE
Payer: COMMERCIAL

## 2019-03-26 VITALS
DIASTOLIC BLOOD PRESSURE: 82 MMHG | TEMPERATURE: 98.1 F | HEIGHT: 70 IN | OXYGEN SATURATION: 95 % | WEIGHT: 162.2 LBS | BODY MASS INDEX: 23.22 KG/M2 | HEART RATE: 68 BPM | SYSTOLIC BLOOD PRESSURE: 120 MMHG

## 2019-03-26 DIAGNOSIS — M54.41 ACUTE RIGHT-SIDED LOW BACK PAIN WITH BILATERAL SCIATICA: ICD-10-CM

## 2019-03-26 DIAGNOSIS — M54.42 ACUTE RIGHT-SIDED LOW BACK PAIN WITH BILATERAL SCIATICA: ICD-10-CM

## 2019-03-26 DIAGNOSIS — R11.0 NAUSEA: ICD-10-CM

## 2019-03-26 DIAGNOSIS — R50.9 FEVER AND CHILLS: ICD-10-CM

## 2019-03-26 DIAGNOSIS — Z12.5 SCREENING FOR PROSTATE CANCER: ICD-10-CM

## 2019-03-26 DIAGNOSIS — E78.2 MIXED HYPERLIPIDEMIA: ICD-10-CM

## 2019-03-26 DIAGNOSIS — E03.4 HYPOTHYROIDISM DUE TO ACQUIRED ATROPHY OF THYROID: ICD-10-CM

## 2019-03-26 DIAGNOSIS — F17.200 TOBACCO DEPENDENCE: ICD-10-CM

## 2019-03-26 PROBLEM — D48.5 NEOPLASM OF UNCERTAIN BEHAVIOR OF SKIN: Status: RESOLVED | Noted: 2018-03-21 | Resolved: 2019-03-26

## 2019-03-26 LAB
APPEARANCE UR: CLEAR
BILIRUB UR QL STRIP.AUTO: NEGATIVE
COLOR UR: YELLOW
CRP SERPL HS-MCNC: 0.9 MG/L (ref 0–7.5)
ERYTHROCYTE [SEDIMENTATION RATE] IN BLOOD BY WESTERGREN METHOD: <1 MM/HOUR (ref 0–20)
GLUCOSE UR STRIP.AUTO-MCNC: NEGATIVE MG/DL
KETONES UR STRIP.AUTO-MCNC: NEGATIVE MG/DL
LEUKOCYTE ESTERASE UR QL STRIP.AUTO: NEGATIVE
MICRO URNS: NORMAL
NITRITE UR QL STRIP.AUTO: NEGATIVE
PH UR STRIP.AUTO: 6 [PH]
PROT UR QL STRIP: NEGATIVE MG/DL
PSA SERPL-MCNC: 0.99 NG/ML (ref 0–4)
RBC UR QL AUTO: NEGATIVE
SP GR UR STRIP.AUTO: 1.02
TSH SERPL DL<=0.005 MIU/L-ACNC: 3.16 UIU/ML (ref 0.38–5.33)
UROBILINOGEN UR STRIP.AUTO-MCNC: 0.2 MG/DL

## 2019-03-26 PROCEDURE — 71046 X-RAY EXAM CHEST 2 VIEWS: CPT

## 2019-03-26 PROCEDURE — 72110 X-RAY EXAM L-2 SPINE 4/>VWS: CPT

## 2019-03-26 PROCEDURE — 81003 URINALYSIS AUTO W/O SCOPE: CPT

## 2019-03-26 PROCEDURE — 36415 COLL VENOUS BLD VENIPUNCTURE: CPT

## 2019-03-26 PROCEDURE — 85652 RBC SED RATE AUTOMATED: CPT

## 2019-03-26 PROCEDURE — 73502 X-RAY EXAM HIP UNI 2-3 VIEWS: CPT | Mod: RT

## 2019-03-26 PROCEDURE — 84443 ASSAY THYROID STIM HORMONE: CPT

## 2019-03-26 PROCEDURE — 86141 C-REACTIVE PROTEIN HS: CPT

## 2019-03-26 PROCEDURE — 99215 OFFICE O/P EST HI 40 MIN: CPT | Performed by: INTERNAL MEDICINE

## 2019-03-26 PROCEDURE — 84153 ASSAY OF PSA TOTAL: CPT

## 2019-03-26 RX ORDER — ROSUVASTATIN CALCIUM 40 MG/1
40 TABLET, COATED ORAL DAILY
Qty: 30 TAB | Refills: 3 | Status: SHIPPED | OUTPATIENT
Start: 2019-03-26 | End: 2019-07-22 | Stop reason: SDUPTHER

## 2019-03-26 ASSESSMENT — ENCOUNTER SYMPTOMS
FEVER: 1
NEUROLOGICAL NEGATIVE: 1
BACK PAIN: 1
VOMITING: 0
EYES NEGATIVE: 1
COUGH: 0
DIAPHORESIS: 1
WEIGHT LOSS: 1
SHORTNESS OF BREATH: 0
CHILLS: 1
PSYCHIATRIC NEGATIVE: 1
ABDOMINAL PAIN: 0
NAUSEA: 1

## 2019-03-26 ASSESSMENT — PATIENT HEALTH QUESTIONNAIRE - PHQ9
SUM OF ALL RESPONSES TO PHQ QUESTIONS 1-9: 4
CLINICAL INTERPRETATION OF PHQ2 SCORE: 2
5. POOR APPETITE OR OVEREATING: 0 - NOT AT ALL

## 2019-03-26 NOTE — PROGRESS NOTES
"Subjective:      Hans Vega is a 63 y.o. male who presents with Follow-Up (sciatica, bad sweats); Flu Like Symptoms; and Nausea        HPI    The patient is here for followup of chronic medical problems listed below as well as flu-like symptoms. The patient is compliant with medications and having no side effects from them. Denies chest pain, abdominal pain, dyspnea, or cough.    Patient states he has had flu-like symptoms over the last 6-8 weeks. He reports have episodes of low grade fever and hot flashes followed by 1-2 hours of being comfortable. He would then have chills which resolve after 15 minutes before this cycle starts again. He has not measured his temperature. He also reports having nausea that is sometimes severe. The nausea has been ongoing over the last 8 weeks. He denies any vomiting. Denies shortness of breath with exertion. He does feel generally better today. He notes having recent 5 lb weight loss but notes trying a new diet.    He also reports ongoing chronic night sweats that have been ongoing over the last several years. He states the night sweats typically occur several times per week. He would usually wake up in the middle of the night with his clothes soaked. He usually gets these episodes once per night but he sometimes gets them twice per night or even more. There are occasional night when he does not get the night sweats.    Patient has a history of sciatica which is chronic in nature. States he had a flare-up of right sided lower back pain ~ 10 days ago. At that time, he had radiation of pain down his right leg, knee, and on the top side of his right foot. States ibuprofen alone does not help. He states that taking 2 tylenol and 1 ibuprofen helps the pain for 10 hours. He notes he hurt his back in November while carrying a block. He believes the pain occurred from moving \"wrong\" but went away in 7-10 days. He believes the pain is muscular. He states that alternating heat and ice " "packs helped resolve the pain.    He mentions that he had a procedure done in December 2018 to remove a basal cell carcinoma.    Patient has a history of hyperlipidemia which is chronic in nature. Patient is taking rosuvastatin 20 mg once per day. Last labs show total cholesterol down from 226 to 204. LDL is still elevated at 111.    Patient has a history of hypothyroidism which is chronic in nature. He does not report any specific associated symptoms regarding hypothyroidism at this time. He needs updated thyroid labs.      Patient Active Problem List   Diagnosis   • Mixed hyperlipidemia   • Hypothyroidism due to acquired atrophy of thyroid   • Tobacco dependence   • Vitamin D deficiency disease       Outpatient Medications Prior to Visit   Medication Sig Dispense Refill   • levothyroxine (SYNTHROID) 75 MCG Tab TAKE ONE TABLET BY MOUTH ONCE DAILY 90 Tab 4   • Cholecalciferol (VITAMIN D-3) 5000 UNITS TABS Take 5,000 Units by mouth every day. 100 Tab 3   • rosuvastatin (CRESTOR) 20 MG Tab Take 1 Tab by mouth every evening. 90 Tab 4     No facility-administered medications prior to visit.         Allergies   Allergen Reactions   • Crestor [Rosuvastatin Calcium]      Myalgias, patient believes it was \"lovasatin\" that causes this      • Zetia [Ezetimibe]      myalgia         Review of Systems   Constitutional: Positive for chills, diaphoresis, fever (subjective, low grade), malaise/fatigue and weight loss (5 lb recently).   HENT: Negative.    Eyes: Negative.    Respiratory: Negative for cough and shortness of breath.    Cardiovascular: Negative for chest pain.   Gastrointestinal: Positive for nausea. Negative for abdominal pain and vomiting.   Genitourinary: Negative.    Musculoskeletal: Positive for back pain (right lower) and joint pain (right hip).        Positive right leg pain   Skin: Negative.    Neurological: Negative.    Endo/Heme/Allergies: Negative.    Psychiatric/Behavioral: Negative.    All other systems " "reviewed and are negative.       Objective:     /82 (BP Location: Left arm, Patient Position: Sitting, BP Cuff Size: Adult)   Pulse 68   Temp 36.7 °C (98.1 °F) (Temporal)   Ht 1.778 m (5' 10\")   Wt 73.6 kg (162 lb 3.2 oz)   SpO2 95%   BMI 23.27 kg/m²     Physical Exam   Constitutional: Oriented to person, place, and time. Appears well-developed and well-nourished. No distress.   Head: Normocephalic and atraumatic.   Right Ear: External ear normal.   Left Ear: External ear normal.   Nose: Nose normal.   Mouth/Throat: Oropharynx is clear and moist. No oropharyngeal exudate.   Eyes: Pupils are equal, round, and reactive to light. Conjunctivae and EOM are normal. Right eye exhibits no discharge. Left eye exhibits no discharge. No scleral icterus.   Neck: Normal range of motion. Neck supple. No JVD present. No tracheal deviation present. No thyromegaly present.   Cardiovascular: Normal rate, regular rhythm, normal heart sounds and intact distal pulses.  Exam reveals no gallop and no friction rub.    No murmur heard.  Pulmonary/Chest: Effort normal. No stridor. No respiratory distress. No wheezing or rales. No tenderness.   Abdominal: Soft. Bowel sounds are normal. No distension and no mass. There is no tenderness. There is no rebound and no guarding. No hernia.   Musculoskeletal: Normal range of motion No edema or tenderness.   Lymphadenopathy: No cervical adenopathy.   Neurological: Alert and oriented to person, place, and time. Normal reflexes. Normal reflexes. No cranial nerve deficit. Normal muscle tone. Coordination normal.   Skin: Skin is warm and dry. No rash noted. Not diaphoretic. No erythema. No pallor.   Psychiatric: Normal mood and affect. Behavior is normal. Judgment and thought content normal.   Nursing note and vitals reviewed.      No results found for: HBA1C  Lab Results   Component Value Date/Time    SODIUM 144 03/20/2019 08:09 AM    SODIUM 138 09/02/2015 09:04 AM    POTASSIUM 5.1 03/20/2019 " 08:09 AM    POTASSIUM 4.3 09/02/2015 09:04 AM    CHLORIDE 108 (H) 03/20/2019 08:09 AM    CHLORIDE 110 09/02/2015 09:04 AM    CO2 24 03/20/2019 08:09 AM    CO2 22 09/02/2015 09:04 AM    GLUCOSE 111 (H) 03/20/2019 08:09 AM    GLUCOSE 104 (H) 09/02/2015 09:04 AM    BUN 26 03/20/2019 08:09 AM    BUN 18 09/02/2015 09:04 AM    CREATININE 1.15 03/20/2019 08:09 AM    CREATININE 1.06 09/02/2015 09:04 AM    BUNCREATRAT 23 03/20/2019 08:09 AM    ALKPHOSPHAT 51 03/20/2019 08:09 AM    ALKPHOSPHAT 57 09/02/2015 09:04 AM    ASTSGOT 33 03/20/2019 08:09 AM    ASTSGOT 19 09/02/2015 09:04 AM    ALTSGPT 39 03/20/2019 08:09 AM    ALTSGPT 26 09/02/2015 09:04 AM    TBILIRUBIN 0.4 03/20/2019 08:09 AM    TBILIRUBIN 0.4 09/02/2015 09:04 AM     No results found for: INR  Lab Results   Component Value Date/Time    CHOLSTRLTOT 204 (H) 03/20/2019 08:09 AM    CHOLSTRLTOT 226 (H) 09/02/2015 09:04 AM     (H) 03/20/2019 08:09 AM     (H) 09/02/2015 09:04 AM    HDL 70 03/20/2019 08:09 AM    HDL 54 09/02/2015 09:04 AM    TRIGLYCERIDE 116 03/20/2019 08:09 AM    TRIGLYCERIDE 92 09/02/2015 09:04 AM       No results found for: TESTOSTERONE  No results found for: TSH  Lab Results   Component Value Date/Time    FREET4 0.96 09/02/2015 09:04 AM    FREET4 0.93 11/05/2014 09:05 AM     Lab Results   Component Value Date/Time    URICACID 5.6 12/24/2012 08:46 AM     No components found for: VITB12  Lab Results   Component Value Date/Time    25HYDROXY 46 09/02/2015 09:04 AM    25HYDROXY 57 11/05/2014 09:05 AM          Assessment/Plan:     1. Acute right-sided low back pain with bilateral sciatica  - Ordering the following xray evaluations  - Referring patient to physiatry.  - REFERRAL TO PHYSIATRY (PMR)  - DX-LUMBAR SPINE-4+ VIEWS; Future  - DX-HIP-UNILATERAL-W/O PELVIS-2/3 VIEWS RIGHT; Future    2. Nausea  - Informed patient he possibly has a viral syndrome which should pass.  - Supportive care instructions and return precautions given.   - Ordering  labs  - WESTERGREN SED RATE; Future    3. Fever and chills  - Informed patient he possibly has a viral syndrome which should pass.  - Supportive care instructions and return precautions given.   - Ordering labs and chest xray.  - URINALYSIS,CULTURE IF INDICATED; Future  - DX-CHEST-2 VIEWS; Future    4. Hypothyroidism due to acquired atrophy of thyroid  - TSH was not ordered on last labs, therefore ordering TSH.  - TSH; Future    5. Tobacco dependence  - Patient reports decreased cigarette use, but states he is still smoking. Discussed the importance of tobacco cessation and encouraged patient to quit smoking.    6. Mixed hyperlipidemia  - Last labs show total cholesterol elevated at 204. Increasing rosuvastatin dose to 40 mg once per day. Discussed potential medication side effects.  - rosuvastatin (CRESTOR) 40 MG tablet; Take 1 Tab by mouth every day.  Dispense: 30 Tab; Refill: 3  - CRP HIGH SENSITIVE (CARDIAC); Future     7. Screening for prostate cancer  - Ordering prostate labs.  - PROSTATE SPECIFIC AG DIAGNOSTIC; Future     Patient will follow up in 6 months.     40 minute face-to-face encounter took place today.  More than half of this time was spent in the coordination of care of the above problems, as well as counseling.     Gaetano TEMPLE (Scribe), am scribing for, and in the presence of, Jitendra Mckay M.D..    Electronically signed by: Gaetano Beckwith (Ellieibe), 3/26/2019    Jitendra TEMPLE M.D., personally performed the services described in this documentation, as scribed by Gaetano Beckwith in my presence, and it is both accurate and complete.

## 2019-04-02 ENCOUNTER — OFFICE VISIT (OUTPATIENT)
Dept: PHYSICAL MEDICINE AND REHAB | Facility: MEDICAL CENTER | Age: 64
End: 2019-04-02
Payer: COMMERCIAL

## 2019-04-02 VITALS
BODY MASS INDEX: 23.42 KG/M2 | DIASTOLIC BLOOD PRESSURE: 78 MMHG | HEIGHT: 70 IN | WEIGHT: 163.58 LBS | TEMPERATURE: 98.7 F | SYSTOLIC BLOOD PRESSURE: 130 MMHG | OXYGEN SATURATION: 97 % | HEART RATE: 58 BPM

## 2019-04-02 DIAGNOSIS — M54.16 LUMBAR RADICULOPATHY: ICD-10-CM

## 2019-04-02 DIAGNOSIS — Z72.0 TOBACCO ABUSE: ICD-10-CM

## 2019-04-02 DIAGNOSIS — M51.36 DEGENERATIVE DISC DISEASE, LUMBAR: ICD-10-CM

## 2019-04-02 DIAGNOSIS — R61 CHRONIC NIGHT SWEATS: ICD-10-CM

## 2019-04-02 DIAGNOSIS — M43.16 SPONDYLOLISTHESIS OF LUMBAR REGION: ICD-10-CM

## 2019-04-02 DIAGNOSIS — M47.816 LUMBAR SPONDYLOSIS: ICD-10-CM

## 2019-04-02 PROCEDURE — 99204 OFFICE O/P NEW MOD 45 MIN: CPT | Mod: 25 | Performed by: PHYSICAL MEDICINE & REHABILITATION

## 2019-04-02 PROCEDURE — 99406 BEHAV CHNG SMOKING 3-10 MIN: CPT | Performed by: PHYSICAL MEDICINE & REHABILITATION

## 2019-04-02 RX ORDER — ACETAMINOPHEN 500 MG
1000 TABLET ORAL EVERY 6 HOURS PRN
COMMUNITY
End: 2019-09-25

## 2019-04-02 RX ORDER — IBUPROFEN 200 MG
200 TABLET ORAL EVERY 6 HOURS PRN
COMMUNITY
End: 2019-09-25

## 2019-04-02 ASSESSMENT — PATIENT HEALTH QUESTIONNAIRE - PHQ9
SUM OF ALL RESPONSES TO PHQ QUESTIONS 1-9: 7
CLINICAL INTERPRETATION OF PHQ2 SCORE: 2
5. POOR APPETITE OR OVEREATING: 1 - SEVERAL DAYS
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
## 2019-04-02 NOTE — Clinical Note
Dear Jitendra cMkay M.D. ,   Thank you for the referral of Hans Vega.  I think that the patient's symptoms are mainly secondary to an intermittent lumbar radiculopathy.  My neurological exam is normal today.  I will first start with physical therapy for him.  Please see my note for more details    Should you have any questions or concerns please do not hesitate to contact me.  Alex Blanchard M.D.

## 2019-04-17 DIAGNOSIS — E03.4 HYPOTHYROIDISM DUE TO ACQUIRED ATROPHY OF THYROID: ICD-10-CM

## 2019-04-18 RX ORDER — LEVOTHYROXINE SODIUM 0.07 MG/1
TABLET ORAL
Qty: 90 TAB | Refills: 4 | Status: SHIPPED | OUTPATIENT
Start: 2019-04-18 | End: 2019-09-25

## 2019-05-02 ENCOUNTER — PHYSICAL THERAPY (OUTPATIENT)
Dept: PHYSICAL THERAPY | Facility: REHABILITATION | Age: 64
End: 2019-05-02
Attending: PHYSICAL MEDICINE & REHABILITATION
Payer: COMMERCIAL

## 2019-05-02 DIAGNOSIS — M54.16 LUMBAR RADICULOPATHY: ICD-10-CM

## 2019-05-02 DIAGNOSIS — M43.16 SPONDYLOLISTHESIS OF LUMBAR REGION: ICD-10-CM

## 2019-05-02 DIAGNOSIS — M47.816 LUMBAR SPONDYLOSIS: ICD-10-CM

## 2019-05-02 DIAGNOSIS — M51.36 DEGENERATIVE DISC DISEASE, LUMBAR: ICD-10-CM

## 2019-05-02 PROCEDURE — 97014 ELECTRIC STIMULATION THERAPY: CPT

## 2019-05-02 PROCEDURE — 97162 PT EVAL MOD COMPLEX 30 MIN: CPT

## 2019-05-02 PROCEDURE — 97110 THERAPEUTIC EXERCISES: CPT

## 2019-05-02 ASSESSMENT — ENCOUNTER SYMPTOMS
QUALITY: ACHING
EXACERBATED BY: MOVEMENT
PAIN TIMING: CONSTANT
PAIN SCALE: 3
ALLEVIATING FACTORS: PHARMACOTHERAPY

## 2019-05-02 NOTE — OP THERAPY EVALUATION
Outpatient Physical Therapy  INITIAL EVALUATION    [facility] [address]  [address]  [place] [address]  Phone:  [phone]  Fax:  [phone]    Date of Evaluation: 05/02/2019    Patient: Hans Vega  YOB: 1955  MRN: [id number]     Referring Provider: Alex Blanchard M.D.  [address]  [address]  [place] [address]   Referring Diagnosis Lumbar radiculopathy [M54.16];Lumbar spondylosis [M47.816];Spondylolisthesis of lumbar region [M43.16];Degenerative disc disease, lumbar [M51.36]     Time Calculation  Start time: 1430  Stop time: 1630 Time Calculation (min): 120 minutes     Physical Therapy Occurrence Codes    Date of onset of impairment:  3/22/19   Date physical therapy care plan established or reviewed:  5/2/19   Date physical therapy treatment started:  5/2/19          Chief Complaint: Back Problem    Visit Diagnoses     ICD-10-CM   1. Lumbar radiculopathy M54.16   2. Lumbar spondylosis M47.816   3. Spondylolisthesis of lumbar region M43.16   4. Degenerative disc disease, lumbar M51.36         Subjective:   History of Present Illness:     Date of onset:  3/22/2019    Mechanism of injury:  Hx of recurrent R sciatic pain, down posterior leg to foot.  Usually resolves with rest within a week.  Last month, had flu-like symptoms and stayed in bed for a week or two.  Had R side low back pain come on, as well as a flare of his sciatica.  States it was debilitating and he could barely move.  Only got up to/from bathroom.  Also, had a flare of hip pain due to an os in his R hip.  It was a 9 to 10/10 pain for a few days.  Got pain down the front of R thigh and leg associated with a cramp on the top of his foot.  States this is a different pain than the sciatica.  Has been to PCP and physiatry without relief.  States tylenol, ibuprofen, and ice and heat have been most helpful.  States in the past week, he has not had as much pain (less intense and less  often).  Currently, pain is at R PSIS to mid lateral thigh.  Working as  at his own company.   Hx of fractured R ilium in 1970s.  2006 had a lifting injury which aggravated pelvic pain.  In November last year, lifted wrong and had back pain.    Pain:     Current pain rating:  3    Location:  Mid R iliac crest    Quality:  Aching    Pain timing:  Constant    Relieving factors:  Pharmacotherapy (tylenol and ibuprofen)    Aggravating factors:  Movement  Social Support:     Lives with:  Spouse  Diagnostic Tests:     X-ray: abnormal      Diagnostic Tests Comments:  BILATERAL HIP XRAY:  There is mild right hip acetabular degenerative sclerosis.  LUMBAR SPINE XRAY:  1.  There is mild degenerative anterolisthesis at the L4-5 level.  2.  There is degenerative disc disease and arthropathy at the L4-5 level.  3.  There is mild degenerative change at other levels.  Treatments:     None    Patient Goals:     Patient goals for therapy:  Decreased pain      Past Medical History:   Diagnosis Date   • Hyperlipidemia    • Thyroid disease     hypothyroid    • Unspecified vitamin D deficiency      Past Surgical History:   Procedure Laterality Date   • OTHER ABDOMINAL SURGERY      Appencectomy     Social History   Substance Use Topics   • Smoking status: Current Every Day Smoker     Packs/day: 0.50     Types: Pipe   • Smokeless tobacco: Never Used   • Alcohol use 12.6 oz/week     21 Cans of beer per week     Family and Occupational History     Social History   • Marital status: Single     Spouse name: N/A   • Number of children: N/A   • Years of education: N/A       Objective     Hip Screen   Hip range of motion within functional limits with the following exceptions: R hip IR with endrange pain, in supine 90/90.    Tenderness     Left Hip   No tenderness in the PSIS and sacroiliac joint.     Right Hip   Tenderness in the sacroiliac joint. No tenderness in the PSIS.     Active Range of Motion     Lumbar   Flexion: decreased (50%  from lumbar standing flexion, has to move slowly to avoid exacerbating pain)  Extension: within functional limits (relieves pain)  Left lateral flexion: within functional limits (unweights RLE when bending to Left due to R side pain)  Right lateral flexion: decreased (Causes R sciatic pain)    Joint Play   Spine     Central PA [place]        L1: WFL       L2: WFL       L3: painful and WFL       L4: WFL       L5: painful and WFL       S1: painful    Unilateral PA Glide (left)        L3: WFL       L4: WFL       L5: WFL       S1: WFL    Unilateral PA Glide (right)        L3: WFL       L4: WFL (relieves pain)       L5: WFL (relieves pain)       S1: painful and WFL        Strength:      Lower extremities   Normal left lower extremity strength  Normal right lower extremity strength    Tests       Lumbar spine (left)      Negative slump.   Lumbar spine (right)     Negative slump.     Left Hip   SLR: Negative.     Right Hip   SLR: Negative.     Rosalba LumbarTest   Static tests   Lying prone: feels good  Lying prone on elbows: increases R SI pain  Sitting erect: increases R SI pain  Standing slouched: more comfortable    Standing repeated motions:   Flexion in standing     Symptoms during testing: produces    Symptoms after testing: no effect  Extension in standing     Symptoms during testing: abolishes    Symptoms after testing: better        Therapeutic Exercises (CPT 85109):     1. Hooklying LTR    2. Bridges    3. Sidelying hip abd    4. Hooklying TA march      Therapeutic Exercise Summary: Provided handout with these exercises for daily HEP.      Time-based treatments/modalities:  Therapeutic exercise minutes (CPT 69913): 10 minutes       Assessment, Response and Plan:   Impairments: activity intolerance, lacks appropriate home exercise program and pain with function    Assessment details:  63 y.o. male presents with R side low back pain.  Pt demonstrates impaired lumbar ROM and SI pain.  Pt will benefit from skilled PT  services to address impairments, provide HEP and education for symptom management, and return to PLOF.  Barriers to therapy:  None  Prognosis: good    Goals:   Short Term Goals:   1. L/S AROM >75% in all directions without increased symptoms.  2. Less than 3/10 pain following work shift.  3. Pt will be independent with daily HEP.  Short term goal time span:  2-4 weeks      Long Term Goals:    1. Pt able to walk > 60 min with < 3/10 pain.  2. Pt requires less tylenol and ibuprofen for pain.  3. Pt will report increased function via improved scores on Milo Telly Low Back Pain and Disability Questionnaire.  Long term goal time span:  6-8 weeks    Plan:   Therapy options:  Physical therapy treatment to continue  Planned therapy interventions:  E Stim Unattended (CPT 76375), Therapeutic Exercise (CPT 08954), Manual Therapy (CPT 37738) and Neuromuscular Re-education (CPT 61777)  Frequency:  2x month  Duration in weeks:  8      Functional Limitations and Severity Modifiers  Hermelindo Telly Low Back Pain and Disability Score: 37.5   Current:     Goal:       Referring provider co-signature:  I have reviewed this plan of care and my co-signature certifies the need for services.  Certification Dates:   From 5/2/19     To 6/26/19    Physician Signature: ________________________________ Date: ______________

## 2019-05-13 ENCOUNTER — PHYSICAL THERAPY (OUTPATIENT)
Dept: PHYSICAL THERAPY | Facility: REHABILITATION | Age: 64
End: 2019-05-13
Attending: PHYSICAL MEDICINE & REHABILITATION
Payer: COMMERCIAL

## 2019-05-13 DIAGNOSIS — M47.816 LUMBAR SPONDYLOSIS: ICD-10-CM

## 2019-05-13 DIAGNOSIS — M51.36 DEGENERATIVE DISC DISEASE, LUMBAR: ICD-10-CM

## 2019-05-13 PROCEDURE — 97110 THERAPEUTIC EXERCISES: CPT

## 2019-05-13 PROCEDURE — 97014 ELECTRIC STIMULATION THERAPY: CPT

## 2019-05-13 NOTE — OP THERAPY DAILY TREATMENT
Outpatient Physical Therapy  DAILY TREATMENT     [facility] [address].  [address]  [place] [address]  Phone:  [phone]  Fax:  [phone]    Date: 05/13/2019    Patient: Hans Vega  YOB: 1955  MRN: [id number]     Time Calculation  Start time: 1400  Stop time: 1450 Time Calculation (min): 50 minutes     Chief Complaint: Back Problem    Visit #: 2    SUBJECTIVE:  The pain he had at R lumbosacral is much better.  Not greater than 1/10 pain since eval.  Pt has taken 6 doses of ibuprofen/tylenol since eval, but not for the R L/S spot.  Doing HEP nightly.  Standing up from couch last week, got a pain in R thoracic region.  Using the weedeater the next day made it worse.  Also painful with picking up items from the floor and coughing.      OBJECTIVE:    R unilateral PA at T7-8 is hypomobile and painful.  Standing L/S AROM: flexion= WNL, with mild right thoracic pain, extension= WNL with mild right lumbosacral pain, B lateral flexion= WNL, B rotation= 75%.        Therapeutic Exercises (CPT 57279):     5. Seated thoracic sidebend, 5 x10sec B    6. Thread the needle, x4 Right    7. UE D2 with hands clasped in standing at wall, x5 B    8. Eliseo pose, forward and diagonal, x15 sec      Therapeutic Exercise Summary: Provided handout with these exercises for daily HEP.    Therapeutic Treatments and Modalities:     1. E Stim Unattended (CPT 08587), IFC and MH to Right mid thoracic in supine, x15 min    Time-based treatments/modalities:  Therapeutic exercise minutes (CPT 14095): 20 minutes       Pain rating before treatment: 4 at right mid thoracic  Pain rating after treatment: 2 at right mid thoracic    ASSESSMENT:   Response to treatment: Reduced symptoms with therex    PLAN/RECOMMENDATIONS:   Plan for treatment: therapy treatment to continue next visit.  Planned interventions for next visit: continue with current treatment.

## 2019-06-10 ENCOUNTER — PHYSICAL THERAPY (OUTPATIENT)
Dept: PHYSICAL THERAPY | Facility: REHABILITATION | Age: 64
End: 2019-06-10
Attending: PHYSICAL MEDICINE & REHABILITATION
Payer: COMMERCIAL

## 2019-06-10 DIAGNOSIS — M51.36 DEGENERATIVE DISC DISEASE, LUMBAR: ICD-10-CM

## 2019-06-10 DIAGNOSIS — M47.816 LUMBAR SPONDYLOSIS: ICD-10-CM

## 2019-06-10 PROCEDURE — 97110 THERAPEUTIC EXERCISES: CPT

## 2019-06-10 NOTE — OP THERAPY PROGRESS SUMMARY
Outpatient Physical Therapy  PROGRESS SUMMARY NOTE      [facility] Physical Therapy [address].  [address]  [place] [address]  Phone:  [phone]  Fax:  [phone]    Date of Visit: 06/10/2019    Patient: Hans Vega  YOB: 1955  MRN: [id number]     Referring Provider: Alex Blanchard M.D.  [address]  [address]  [place] [address]   Referring Diagnosis Radiculopathy, lumbar region [M54.16];Spondylosis without myelopathy or radiculopathy, lumbar region [M47.816];Spondylolisthesis, lumbar region [M43.16];Other intervertebral disc degeneration, lumbar region [M51.36]     Visit Diagnoses     ICD-10-CM   1. Lumbar spondylosis M47.816   2. Degenerative disc disease, lumbar M51.36       Rehab Potential: good    Physical Therapy Occurrence Codes    Date of onset of impairment:  3/22/19   Date physical therapy care plan established or reviewed:  5/2/19   Date physical therapy treatment started:  5/2/19          Cert Period: 6/11/19 to 8/11/19  Progress Report Period: 5/2/19 to 6/10/19    Functional Limitations and Severity Modifiers                      Objective Findings and Assessment:   Patient progression towards goals: Improved symptoms and function.  Less than 0.5/10 pain in R lumbosacral area.  Walking and work are not limited by pain.  Doing HEP as needed to avoid exacerbation of pain.  Has not required tylenol or ibuprofen for a few weeks now.    Objective findings and assessment details: R unilateral PA at T7-8 is hypomobile and painful.  Standing L/S AROM: flexion= WNL, with mild right thoracic pain, extension= WNL with mild right lumbosacral pain, B lateral flexion= WNL, B rotation= 75%.    Goals:   Short Term Goals:   1. L/S AROM >75% in all directions without increased symptoms.- MET  2. Less than 3/10 pain following work shift.- MET  3. Pt will be independent with daily HEP.- MET  Short term goal time span:  2-4 weeks      Long Term Goals:    1. Pt able to  walk > 60 min with < 3/10 pain.- MET  2. Pt requires less tylenol and ibuprofen for pain.- MET  3. Pt will report increased function via improved scores on Milo Telly Low Back Pain and Disability Questionnaire.- NOT ASSESSED, CONTINUE  NEW LONG TERM GOALS:  4. Pt able to perform HEP > 3 x/week without pain.  5. Pt will avoid re-injury at work.  Long term goal time span:  6-8 weeks    Plan:   Planned therapy interventions:  Therapeutic Exercise (CPT 07033), Neuromuscular Re-education (CPT 54179) and E Stim Unattended (CPT 44079)  Frequency:  1x month  Duration in weeks:  8  Plan details:  Pt to return in 8 weeks for follow up to re-assess symptoms, function, and goals.        Referring provider co-signature:  I have reviewed this plan of care and my co-signature certifies the need for services.    Physician Signature: ________________________________ Date: ______________

## 2019-06-10 NOTE — OP THERAPY DAILY TREATMENT
Outpatient Physical Therapy  DAILY TREATMENT     [facility] Physical [address].  [address]  [place] [address]  Phone:  [phone]  Fax:  [phone]    Date: 06/10/2019    Patient: Hans Vega  YOB: 1955  MRN: [id number]     Time Calculation  Start time: 1400  Stop time: 1433 Time Calculation (min): 33 minutes     Chief Complaint: Back Problem    Visit #: 3    SUBJECTIVE:  No pain meds over the past month.  I feel like we've reached our goals.  The mid-thoracic or rib problem I was having last visit is almost gone.  Lumbosacral pain is less than 1/10.  Doing HEP as needed, keeps pain from getting exacerbated.  My R>L shoulder hurts when I do child's pose stretch.  I think I hurt my rotator cuff, maybe from torquing the wrench.  Does not feel like pain limits his walking tolerance.      OBJECTIVE:        Therapeutic Exercises (CPT 57946):     1. Scap retraction against wall, x10    2. B shoulder ER against wall, med resistance band, x10    3. Serratus punch in supine , x10    4. Cat-cow, x10, manual and verbal cues for increased AROM.    5. Alt UE reach at wall with extra light resistance band, x10    6. Wall serratus push, deferred, unable to coordinate scap retraction in this position    7. Eliseo pose-forward and diagonal, x10, R shoulder pain on first stretch, then relieved.      Therapeutic Exercise Summary: Add scap squeezes and serratus punches to HEP.  Recommend doing these prior to child's pose to improve thoracic and scapular ROM.      Time-based treatments/modalities:  Therapeutic exercise minutes (CPT 73520): 30 minutes       ASSESSMENT:   Response to treatment: Improved symptoms and function.    PLAN/RECOMMENDATIONS:   Plan for treatment: therapy treatment to continue next visit. Pt to return in 8 weeks to assess symptoms and function, advance HEP if needed.  Planned interventions for next visit: continue with current treatment.

## 2019-07-08 ENCOUNTER — APPOINTMENT (RX ONLY)
Dept: URBAN - METROPOLITAN AREA CLINIC 22 | Facility: CLINIC | Age: 64
Setting detail: DERMATOLOGY
End: 2019-07-08

## 2019-07-08 DIAGNOSIS — Z85.828 PERSONAL HISTORY OF OTHER MALIGNANT NEOPLASM OF SKIN: ICD-10-CM

## 2019-07-08 DIAGNOSIS — L82.1 OTHER SEBORRHEIC KERATOSIS: ICD-10-CM

## 2019-07-08 PROCEDURE — 99213 OFFICE O/P EST LOW 20 MIN: CPT

## 2019-07-08 PROCEDURE — ? COUNSELING

## 2019-07-08 ASSESSMENT — LOCATION SIMPLE DESCRIPTION DERM
LOCATION SIMPLE: LEFT ANTERIOR NECK
LOCATION SIMPLE: RIGHT EAR

## 2019-07-08 ASSESSMENT — LOCATION ZONE DERM
LOCATION ZONE: NECK
LOCATION ZONE: EAR

## 2019-07-08 ASSESSMENT — LOCATION DETAILED DESCRIPTION DERM
LOCATION DETAILED: LEFT SUPERIOR ANTERIOR NECK
LOCATION DETAILED: RIGHT SUPERIOR POSTERIOR HELIX

## 2019-07-22 DIAGNOSIS — E78.2 MIXED HYPERLIPIDEMIA: ICD-10-CM

## 2019-07-22 RX ORDER — ROSUVASTATIN CALCIUM 40 MG/1
40 TABLET, COATED ORAL DAILY
Qty: 100 TAB | Refills: 4 | Status: SHIPPED | OUTPATIENT
Start: 2019-07-22 | End: 2019-09-25 | Stop reason: SDUPTHER

## 2019-08-05 ENCOUNTER — APPOINTMENT (OUTPATIENT)
Dept: PHYSICAL THERAPY | Facility: REHABILITATION | Age: 64
End: 2019-08-05
Attending: PHYSICAL MEDICINE & REHABILITATION
Payer: COMMERCIAL

## 2019-08-29 ENCOUNTER — APPOINTMENT (OUTPATIENT)
Dept: PHYSICAL THERAPY | Facility: REHABILITATION | Age: 64
End: 2019-08-29
Attending: PHYSICAL MEDICINE & REHABILITATION
Payer: COMMERCIAL

## 2019-09-06 ENCOUNTER — TELEPHONE (OUTPATIENT)
Dept: PHYSICAL THERAPY | Facility: REHABILITATION | Age: 64
End: 2019-09-06

## 2019-09-06 NOTE — OP THERAPY DISCHARGE SUMMARY
Outpatient Physical Therapy  DISCHARGE SUMMARY NOTE      [facility] Physical Therapy [address].  [address]  [place] [address]  Phone:  [phone]  Fax:  [phone]    Date of Visit: 09/06/2019    Patient: Raymundo Vega  YOB: 1955  MRN: [id number]     Referring Provider: Alex Blanchard M.D.  [address]  [address]  [place] [address]   Referring Diagnosis Lumbar radiculopathy [M54.16];Lumbar spondylosis [M47.816];Spondylolisthesis of lumbar region [M43.16];Degenerative disc disease, lumbar [M51.36]     Physical Therapy Occurrence Codes    Date of onset of impairment:  3/22/19   Date physical therapy care plan established or reviewed:  5/2/19   Date physical therapy treatment started:  5/2/19          Functional Assessment Used        Your patient is being discharged from Physical Therapy with the following comments:   · Goals met    Recommendations:  Continue HEP.    Ofelia Aguilera, PT    Date: 9/6/2019

## 2019-09-19 LAB
ALBUMIN SERPL-MCNC: 4.3 G/DL (ref 3.6–4.8)
ALBUMIN/GLOB SERPL: 1.7 {RATIO} (ref 1.2–2.2)
ALP SERPL-CCNC: 66 IU/L (ref 39–117)
ALT SERPL-CCNC: 45 IU/L (ref 0–44)
AST SERPL-CCNC: 30 IU/L (ref 0–40)
BASOPHILS # BLD AUTO: 0.1 X10E3/UL (ref 0–0.2)
BASOPHILS NFR BLD AUTO: 0 %
BILIRUB SERPL-MCNC: 0.3 MG/DL (ref 0–1.2)
BUN SERPL-MCNC: 15 MG/DL (ref 8–27)
BUN/CREAT SERPL: 13 (ref 10–24)
CALCIUM SERPL-MCNC: 9.5 MG/DL (ref 8.6–10.2)
CHLORIDE SERPL-SCNC: 107 MMOL/L (ref 96–106)
CHOLEST SERPL-MCNC: 151 MG/DL (ref 100–199)
CO2 SERPL-SCNC: 21 MMOL/L (ref 20–29)
CREAT SERPL-MCNC: 1.16 MG/DL (ref 0.76–1.27)
EOSINOPHIL # BLD AUTO: 0.2 X10E3/UL (ref 0–0.4)
EOSINOPHIL NFR BLD AUTO: 2 %
ERYTHROCYTE [DISTWIDTH] IN BLOOD BY AUTOMATED COUNT: 14.3 % (ref 12.3–15.4)
GLOBULIN SER CALC-MCNC: 2.6 G/DL (ref 1.5–4.5)
GLUCOSE SERPL-MCNC: 104 MG/DL (ref 65–99)
HCT VFR BLD AUTO: 48.3 % (ref 37.5–51)
HDLC SERPL-MCNC: 60 MG/DL
HGB BLD-MCNC: 16.2 G/DL (ref 13–17.7)
IMM GRANULOCYTES # BLD AUTO: 0.1 X10E3/UL (ref 0–0.1)
IMM GRANULOCYTES NFR BLD AUTO: 1 %
IMMATURE CELLS  115398: ABNORMAL
LABORATORY COMMENT REPORT: NORMAL
LDLC SERPL CALC-MCNC: 75 MG/DL (ref 0–99)
LYMPHOCYTES # BLD AUTO: 2.6 X10E3/UL (ref 0.7–3.1)
LYMPHOCYTES NFR BLD AUTO: 23 %
MCH RBC QN AUTO: 31.8 PG (ref 26.6–33)
MCHC RBC AUTO-ENTMCNC: 33.5 G/DL (ref 31.5–35.7)
MCV RBC AUTO: 95 FL (ref 79–97)
MONOCYTES # BLD AUTO: 1 X10E3/UL (ref 0.1–0.9)
MONOCYTES NFR BLD AUTO: 9 %
MORPHOLOGY BLD-IMP: ABNORMAL
NEUTROPHILS # BLD AUTO: 7.4 X10E3/UL (ref 1.4–7)
NEUTROPHILS NFR BLD AUTO: 65 %
NRBC BLD AUTO-RTO: ABNORMAL %
PLATELET # BLD AUTO: 209 X10E3/UL (ref 150–450)
POTASSIUM SERPL-SCNC: 4.5 MMOL/L (ref 3.5–5.2)
PROT SERPL-MCNC: 6.9 G/DL (ref 6–8.5)
RBC # BLD AUTO: 5.09 X10E6/UL (ref 4.14–5.8)
SODIUM SERPL-SCNC: 143 MMOL/L (ref 134–144)
TRIGL SERPL-MCNC: 79 MG/DL (ref 0–149)
TSH SERPL DL<=0.005 MIU/L-ACNC: 4.9 UIU/ML (ref 0.45–4.5)
VLDLC SERPL CALC-MCNC: 16 MG/DL (ref 5–40)
WBC # BLD AUTO: 11.3 X10E3/UL (ref 3.4–10.8)

## 2019-09-25 ENCOUNTER — OFFICE VISIT (OUTPATIENT)
Dept: MEDICAL GROUP | Age: 64
End: 2019-09-25
Payer: MEDICARE

## 2019-09-25 VITALS
SYSTOLIC BLOOD PRESSURE: 122 MMHG | WEIGHT: 163.6 LBS | OXYGEN SATURATION: 97 % | DIASTOLIC BLOOD PRESSURE: 74 MMHG | HEIGHT: 69 IN | TEMPERATURE: 97.7 F | BODY MASS INDEX: 24.23 KG/M2 | HEART RATE: 57 BPM

## 2019-09-25 DIAGNOSIS — Z23 NEED FOR VACCINATION: ICD-10-CM

## 2019-09-25 DIAGNOSIS — E78.2 MIXED HYPERLIPIDEMIA: ICD-10-CM

## 2019-09-25 DIAGNOSIS — E03.4 HYPOTHYROIDISM DUE TO ACQUIRED ATROPHY OF THYROID: ICD-10-CM

## 2019-09-25 DIAGNOSIS — F17.200 TOBACCO DEPENDENCE: ICD-10-CM

## 2019-09-25 PROCEDURE — G0008 ADMIN INFLUENZA VIRUS VAC: HCPCS | Performed by: INTERNAL MEDICINE

## 2019-09-25 PROCEDURE — G0009 ADMIN PNEUMOCOCCAL VACCINE: HCPCS | Performed by: INTERNAL MEDICINE

## 2019-09-25 PROCEDURE — 99214 OFFICE O/P EST MOD 30 MIN: CPT | Mod: 25 | Performed by: INTERNAL MEDICINE

## 2019-09-25 PROCEDURE — 90670 PCV13 VACCINE IM: CPT | Performed by: INTERNAL MEDICINE

## 2019-09-25 PROCEDURE — 90662 IIV NO PRSV INCREASED AG IM: CPT | Performed by: INTERNAL MEDICINE

## 2019-09-25 RX ORDER — ROSUVASTATIN CALCIUM 40 MG/1
40 TABLET, COATED ORAL DAILY
Qty: 100 TAB | Refills: 4 | Status: SHIPPED | OUTPATIENT
Start: 2019-09-25 | End: 2020-12-14 | Stop reason: SDUPTHER

## 2019-09-25 RX ORDER — LEVOTHYROXINE SODIUM 88 UG/1
88 TABLET ORAL
Qty: 90 TAB | Refills: 4 | Status: SHIPPED | OUTPATIENT
Start: 2019-09-25 | End: 2020-12-14 | Stop reason: SDUPTHER

## 2019-09-25 RX ORDER — LEVOTHYROXINE SODIUM 0.07 MG/1
TABLET ORAL
Qty: 90 TAB | Refills: 4 | Status: CANCELLED | OUTPATIENT
Start: 2019-09-25

## 2019-09-25 ASSESSMENT — ENCOUNTER SYMPTOMS
NEUROLOGICAL NEGATIVE: 1
CONSTITUTIONAL NEGATIVE: 1
GASTROINTESTINAL NEGATIVE: 1
CARDIOVASCULAR NEGATIVE: 1
RESPIRATORY NEGATIVE: 1

## 2019-09-25 ASSESSMENT — PAIN SCALES - GENERAL: PAINLEVEL: NO PAIN

## 2019-09-25 NOTE — PROGRESS NOTES
"Subjective:      Hans Vega is a 64 y.o. male who presents with Low Back Pain (6 mo Fv) and Hyperlipidemia (6 mo Fv)        HPI  The patient is here for followup of chronic medical problems listed below. The patient is compliant with medications and having no side effects from them. Denies chest pain, abdominal pain, dyspnea, myalgias, or cough.    Patient has a history of hyperlipidemia, currently taking Rosuvastatin 40 mg daily. Recent lipid profile on 9/18/19 was within normal limits. Patient denies any side effects.     Patient has a history of hypothyroidism, currently taking Levothyroxine 75 mcg daily. TSH was 4.900 on 9/18/19. Patient denies any side effects to Levothyroxine.          Patient Active Problem List   Diagnosis   • Mixed hyperlipidemia   • Hypothyroidism due to acquired atrophy of thyroid   • Tobacco dependence   • Vitamin D deficiency disease       Outpatient Medications Prior to Visit   Medication Sig Dispense Refill   • aspirin EC (ECOTRIN) 81 MG Tablet Delayed Response Take 81 mg by mouth every day.     • Cholecalciferol (VITAMIN D-3) 5000 UNITS TABS Take 5,000 Units by mouth every day. 100 Tab 3   • rosuvastatin (CRESTOR) 40 MG tablet Take 1 Tab by mouth every day. 100 Tab 4   • levothyroxine (SYNTHROID) 75 MCG Tab TAKE 1 TABLET BY MOUTH ONCE DAILY 90 Tab 4   • acetaminophen (TYLENOL) 500 MG Tab Take 1,000 mg by mouth every 6 hours as needed.     • ibuprofen (MOTRIN) 200 MG Tab Take 200 mg by mouth every 6 hours as needed.       No facility-administered medications prior to visit.         Allergies   Allergen Reactions   • Crestor [Rosuvastatin Calcium]      Myalgias, patient believes it was \"lovasatin\" that causes this      • Zetia [Ezetimibe]      myalgia         Review of Systems   Constitutional: Negative.    HENT: Negative.    Respiratory: Negative.    Cardiovascular: Negative.    Gastrointestinal: Negative.    Genitourinary: Negative.    Neurological: Negative.    All other " "systems reviewed and are negative.       Objective:     /74   Pulse (!) 57   Temp 36.5 °C (97.7 °F) (Temporal)   Ht 1.753 m (5' 9\")   Wt 74.2 kg (163 lb 9.6 oz)   SpO2 97%   BMI 24.16 kg/m²     Physical Exam   Constitutional: Oriented to person, place, and time. Appears well-developed and well-nourished. No distress.   Head: Normocephalic and atraumatic.   Right Ear: External ear normal.   Left Ear: External ear normal.   Nose: Nose normal.   Mouth/Throat: Oropharynx is clear and moist. No oropharyngeal exudate.   Eyes: Pupils are equal, round, and reactive to light. Conjunctivae and EOM are normal. Right eye exhibits no discharge. Left eye exhibits no discharge. No scleral icterus.   Neck: Normal range of motion. Neck supple. No JVD present. No tracheal deviation present. No thyromegaly present.   Cardiovascular: Normal rate, regular rhythm, normal heart sounds and intact distal pulses.  Exam reveals no gallop and no friction rub.    No murmur heard.  Pulmonary/Chest: Effort normal. No stridor. No respiratory distress. No wheezing or rales. No tenderness.   Abdominal: Soft. Bowel sounds are normal. No distension and no mass. There is no tenderness. There is no rebound and no guarding. No hernia.   Musculoskeletal: Normal range of motion No edema or tenderness.   Lymphadenopathy: No cervical adenopathy.   Neurological: Alert and oriented to person, place, and time. Normal reflexes. Normal reflexes. No cranial nerve deficit. Normal muscle tone. Coordination normal.   Skin: Skin is warm and dry. No rash noted. Not diaphoretic. No erythema. No pallor.   Psychiatric: Normal mood and affect. Behavior is normal. Judgment and thought content normal.   Nursing note and vitals reviewed.      No results found for: HBA1C  Lab Results   Component Value Date/Time    SODIUM 143 09/18/2019 04:40 AM    SODIUM 138 09/02/2015 09:04 AM    POTASSIUM 4.5 09/18/2019 04:40 AM    POTASSIUM 4.3 09/02/2015 09:04 AM    CHLORIDE " 107 (H) 09/18/2019 04:40 AM    CHLORIDE 110 09/02/2015 09:04 AM    CO2 21 09/18/2019 04:40 AM    CO2 22 09/02/2015 09:04 AM    GLUCOSE 104 (H) 09/18/2019 04:40 AM    GLUCOSE 104 (H) 09/02/2015 09:04 AM    BUN 15 09/18/2019 04:40 AM    BUN 18 09/02/2015 09:04 AM    CREATININE 1.16 09/18/2019 04:40 AM    CREATININE 1.06 09/02/2015 09:04 AM    BUNCREATRAT 13 09/18/2019 04:40 AM    ALKPHOSPHAT 66 09/18/2019 04:40 AM    ALKPHOSPHAT 57 09/02/2015 09:04 AM    ASTSGOT 30 09/18/2019 04:40 AM    ASTSGOT 19 09/02/2015 09:04 AM    ALTSGPT 45 (H) 09/18/2019 04:40 AM    ALTSGPT 26 09/02/2015 09:04 AM    TBILIRUBIN 0.3 09/18/2019 04:40 AM    TBILIRUBIN 0.4 09/02/2015 09:04 AM     No results found for: INR  Lab Results   Component Value Date/Time    CHOLSTRLTOT 151 09/18/2019 04:40 AM    CHOLSTRLTOT 226 (H) 09/02/2015 09:04 AM    LDL 75 09/18/2019 04:40 AM     (H) 09/02/2015 09:04 AM    HDL 60 09/18/2019 04:40 AM    HDL 54 09/02/2015 09:04 AM    TRIGLYCERIDE 79 09/18/2019 04:40 AM    TRIGLYCERIDE 92 09/02/2015 09:04 AM       No results found for: TESTOSTERONE  Lab Results   Component Value Date/Time    TSH 4.900 (H) 09/18/2019 04:40 AM     Lab Results   Component Value Date/Time    FREET4 0.96 09/02/2015 09:04 AM    FREET4 0.93 11/05/2014 09:05 AM     Lab Results   Component Value Date/Time    URICACID 5.6 12/24/2012 08:46 AM     No components found for: VITB12  Lab Results   Component Value Date/Time    25HYDROXY 46 09/02/2015 09:04 AM    25HYDROXY 57 11/05/2014 09:05 AM        Assessment/Plan:         1. Need for vaccination  - Influenza Vaccine, High Dose (65+ Only)  - Pneumococcal Conjugate Vaccine 13-Valent IM    2. Mixed hyperlipidemia  - Chronic, Under good control with Rosuvastatin 40 mg daily. Continue same regimen.   - rosuvastatin (CRESTOR) 40 MG tablet; Take 1 Tab by mouth every day.  Dispense: 100 Tab; Refill: 4  - TSH; Future  - Comp Metabolic Panel; Future  - Lipid Profile; Future  - CBC WITH DIFFERENTIAL;  Future    3. Hypothyroidism due to acquired atrophy of thyroid  - Not under good control. TSH was 4.900 on 9/18/19. Patient currently taking Levothyroxine 75 mcg daily.   - Patient will increase dose of Levothyroxine to 88 mcg daily due to elevated TSH.  - levothyroxine (SYNTHROID) 88 MCG Tab; Take 1 Tab by mouth Every morning on an empty stomach.  Dispense: 90 Tab; Refill: 4    4. Tobacco dependence  - Chronic, Patient counseled on tobacco cessation.          30 minute face-to-face encounter took place today.  More than half of this time was spent in the coordination of care of the above problems, as well as counseling.     Hong TEMPLE (Scribe), am scribing for, and in the presence of, Jitendra Mckay M.D.    Electronically signed by: Hong Velasquez (Scribe), 9/25/2019    Jitendra TEMPLE M.D., personally performed the services described in this documentation, as scribed by Hong Velasquez in my presence, and it is both accurate and complete.

## 2019-10-02 ENCOUNTER — APPOINTMENT (OUTPATIENT)
Dept: PHYSICAL MEDICINE AND REHAB | Facility: MEDICAL CENTER | Age: 64
End: 2019-10-02
Payer: MEDICARE

## 2020-03-04 ENCOUNTER — OFFICE VISIT (OUTPATIENT)
Dept: MEDICAL GROUP | Age: 65
End: 2020-03-04
Payer: MEDICARE

## 2020-03-04 VITALS
HEART RATE: 64 BPM | SYSTOLIC BLOOD PRESSURE: 116 MMHG | DIASTOLIC BLOOD PRESSURE: 78 MMHG | HEIGHT: 69 IN | WEIGHT: 178 LBS | TEMPERATURE: 97.8 F | BODY MASS INDEX: 26.36 KG/M2 | OXYGEN SATURATION: 95 %

## 2020-03-04 DIAGNOSIS — E03.4 HYPOTHYROIDISM DUE TO ACQUIRED ATROPHY OF THYROID: ICD-10-CM

## 2020-03-04 DIAGNOSIS — F17.200 TOBACCO DEPENDENCE: ICD-10-CM

## 2020-03-04 DIAGNOSIS — M15.9 PRIMARY OSTEOARTHRITIS INVOLVING MULTIPLE JOINTS: ICD-10-CM

## 2020-03-04 DIAGNOSIS — E55.9 VITAMIN D DEFICIENCY DISEASE: ICD-10-CM

## 2020-03-04 DIAGNOSIS — E78.2 MIXED HYPERLIPIDEMIA: ICD-10-CM

## 2020-03-04 PROCEDURE — 99214 OFFICE O/P EST MOD 30 MIN: CPT | Performed by: INTERNAL MEDICINE

## 2020-03-04 ASSESSMENT — ENCOUNTER SYMPTOMS
GASTROINTESTINAL NEGATIVE: 1
CONSTITUTIONAL NEGATIVE: 1
NEUROLOGICAL NEGATIVE: 1
CARDIOVASCULAR NEGATIVE: 1
EYES NEGATIVE: 1
PSYCHIATRIC NEGATIVE: 1
RESPIRATORY NEGATIVE: 1
MYALGIAS: 1

## 2020-03-04 ASSESSMENT — FIBROSIS 4 INDEX: FIB4 SCORE: 1.39

## 2020-03-04 ASSESSMENT — PATIENT HEALTH QUESTIONNAIRE - PHQ9: CLINICAL INTERPRETATION OF PHQ2 SCORE: 0

## 2020-03-04 NOTE — PROGRESS NOTES
Subjective:      Hans Vega is a 64 y.o. male who presents with Hyperlipidemia (lab review)        HPI    The patient is here for follow up of chronic medical problems listed below. The patient is compliant with medications and having no side effects from them. Denies chest pain, abdominal pain, dyspnea, myalgias, or cough.    1. Hypothyroidism due to acquired atrophy of thyroid  Chronic. Patient reports compliancy with levothyroxine 88 mcg QD and denies any associated side 4effects. Today we reviewed blood work from 2/28/20 which indicated normal TSH of 4.900. He denies any current symptoms of hypo- or hyperthyroidism.    2. Mixed hyperlipidemia  Chronic. Patient reports compliancy with rosuvastatin 40 mg QN. He has noted some body wide myalgias, which he reports he's had as a side effect from statins in the past. He's been on rosuvastatin for several years and reports muscle pain came on 1 month ago. Today we reviewed blood work from 2/28/20 which indicated stable lipid panel with tot 160; tri 79; HDL 65; LDL 79. The 10-year ASCVD risk score (Ivy DC Jr., et al., 2013) is: 11.1%. He denies any chest pain or claudication.    3. Primary osteoarthritis involving multiple joints  Chronic. Patient has been complaining of body wide muscle aches, see above (#2). He's been doing PT and taking ibuprofen and tylenol PRN with mild-moderate relief.    4. Vitamin D deficiency disease  Chronic. Patient reports compliancy with cholecalciferol 5000 IU QD and denies any associated side effects. Today we reviewed blood work from 9/2/15 which indicated normal vitamin D level of 46.    5. Tobacco dependence  Patient reports he's quit cigarettes however he now smokes a pipe, typically 1 bowl per day.     Patient Active Problem List   Diagnosis   • Mixed hyperlipidemia   • Hypothyroidism due to acquired atrophy of thyroid   • Tobacco dependence   • Vitamin D deficiency disease       Outpatient Medications Prior to Visit  "  Medication Sig Dispense Refill   • aspirin EC (ECOTRIN) 81 MG Tablet Delayed Response Take 81 mg by mouth every day.     • rosuvastatin (CRESTOR) 40 MG tablet Take 1 Tab by mouth every day. 100 Tab 4   • levothyroxine (SYNTHROID) 88 MCG Tab Take 1 Tab by mouth Every morning on an empty stomach. 90 Tab 4   • Cholecalciferol (VITAMIN D-3) 5000 UNITS TABS Take 5,000 Units by mouth every day. 100 Tab 3     No facility-administered medications prior to visit.         Allergies   Allergen Reactions   • Crestor [Rosuvastatin Calcium]      Myalgias, patient believes it was \"lovasatin\" that causes this      • Zetia [Ezetimibe]      myalgia         Review of Systems   Constitutional: Negative.    HENT: Negative.    Eyes: Negative.    Respiratory: Negative.    Cardiovascular: Negative.    Gastrointestinal: Negative.    Genitourinary: Negative.    Musculoskeletal: Positive for myalgias (body wide).   Skin: Negative.    Neurological: Negative.    Endo/Heme/Allergies: Negative.    Psychiatric/Behavioral: Negative.    All other systems reviewed and are negative.       Objective:     /78 (BP Location: Left arm, Patient Position: Sitting, BP Cuff Size: Adult)   Pulse 64   Temp 36.6 °C (97.8 °F) (Temporal)   Ht 1.753 m (5' 9\")   Wt 80.7 kg (178 lb)   SpO2 95%   BMI 26.29 kg/m²     Physical Exam   Constitutional: Oriented to person, place, and time. Appears well-developed and well-nourished. No distress.   Head: Normocephalic and atraumatic.   Right Ear: External ear normal.   Left Ear: External ear normal.   Nose: Nose normal.   Mouth/Throat: Oropharynx is clear and moist. No oropharyngeal exudate.   Eyes: Pupils are equal, round, and reactive to light. Conjunctivae and EOM are normal. Right eye exhibits no discharge. Left eye exhibits no discharge. No scleral icterus.   Neck: Normal range of motion. Neck supple. No JVD present. No tracheal deviation present. No thyromegaly present.   Cardiovascular: Normal rate, " regular rhythm, normal heart sounds and intact distal pulses.  Exam reveals no gallop and no friction rub.    No murmur heard.  Pulmonary/Chest: Effort normal. No stridor. No respiratory distress. No wheezing or rales. No tenderness.   Abdominal: Soft. Bowel sounds are normal. No distension and no mass. There is no tenderness. There is no rebound and no guarding. No hernia.   Musculoskeletal: Normal range of motion No edema or tenderness.   Lymphadenopathy: No cervical adenopathy.   Neurological: Alert and oriented to person, place, and time. Normal reflexes. Normal reflexes. No cranial nerve deficit. Normal muscle tone. Coordination normal.   Skin: Skin is warm and dry. No rash noted. Not diaphoretic. No erythema. No pallor.   Psychiatric: Normal mood and affect. Behavior is normal. Judgment and thought content normal.   Nursing note and vitals reviewed.      No results found for: HBA1C  Lab Results   Component Value Date/Time    SODIUM 143 09/18/2019 04:40 AM    SODIUM 138 09/02/2015 09:04 AM    POTASSIUM 4.5 09/18/2019 04:40 AM    POTASSIUM 4.3 09/02/2015 09:04 AM    CHLORIDE 107 (H) 09/18/2019 04:40 AM    CHLORIDE 110 09/02/2015 09:04 AM    CO2 21 09/18/2019 04:40 AM    CO2 22 09/02/2015 09:04 AM    GLUCOSE 104 (H) 09/18/2019 04:40 AM    GLUCOSE 104 (H) 09/02/2015 09:04 AM    BUN 15 09/18/2019 04:40 AM    BUN 18 09/02/2015 09:04 AM    CREATININE 1.16 09/18/2019 04:40 AM    CREATININE 1.06 09/02/2015 09:04 AM    BUNCREATRAT 13 09/18/2019 04:40 AM    ALKPHOSPHAT 66 09/18/2019 04:40 AM    ALKPHOSPHAT 57 09/02/2015 09:04 AM    ASTSGOT 30 09/18/2019 04:40 AM    ASTSGOT 19 09/02/2015 09:04 AM    ALTSGPT 45 (H) 09/18/2019 04:40 AM    ALTSGPT 26 09/02/2015 09:04 AM    TBILIRUBIN 0.3 09/18/2019 04:40 AM    TBILIRUBIN 0.4 09/02/2015 09:04 AM     No results found for: INR  Lab Results   Component Value Date/Time    CHOLSTRLTOT 151 09/18/2019 04:40 AM    CHOLSTRLTOT 226 (H) 09/02/2015 09:04 AM    LDL 75 09/18/2019 04:40 AM      (H) 09/02/2015 09:04 AM    HDL 60 09/18/2019 04:40 AM    HDL 54 09/02/2015 09:04 AM    TRIGLYCERIDE 79 09/18/2019 04:40 AM    TRIGLYCERIDE 92 09/02/2015 09:04 AM       No results found for: TESTOSTERONE  Lab Results   Component Value Date/Time    TSH 4.900 (H) 09/18/2019 04:40 AM     Lab Results   Component Value Date/Time    FREET4 0.96 09/02/2015 09:04 AM    FREET4 0.93 11/05/2014 09:05 AM     Lab Results   Component Value Date/Time    URICACID 5.6 12/24/2012 08:46 AM     No components found for: VITB12  Lab Results   Component Value Date/Time    25HYDROXY 46 09/02/2015 09:04 AM    25HYDROXY 57 11/05/2014 09:05 AM          Assessment/Plan:     1. Hypothyroidism due to acquired atrophy of thyroid  - Well-controlled. Continue current regimen, levothyroxine 88 mcg QD. I reviewed potential risks, benefits, and side effects of this medication with the patient in clinic today.  - Plan to continue to monitor with blood work.  - TSH; Future    2. Mixed hyperlipidemia  - Well-controlled.  - Due to body wide myalgias, patient is advised to discontinue rosuvastatin 40 mg QN for 1 month to see if symptoms resolve. He should then restart the medication and if the symptoms return, he should return to clinic for evaluation.  - Advised to eat low fat, low carbohydrate and high fiber diet as well as do cardio physical exercise regularly.   - Plan to continue to monitor with blood work.  - Comp Metabolic Panel; Future  - Lipid Profile; Future  - CBC WITH DIFFERENTIAL; Future  - CRP HIGH SENSITIVE (CARDIAC); Future    3. Primary osteoarthritis involving multiple joints  - Due to 1 month of body wide aches, work up ordered as follows.   - Advised on use of tylenol BID PRN. I reviewed potential risks, benefits, and side effects of this medication with the patient in clinic today.  - Continue to follow with PT regularly.  - Sed Rate; Future    4. Vitamin D deficiency disease  - Well-controlled. Continue current regimen,  cholecalciferol 5000 IU QD. I reviewed potential risks, benefits, and side effects of this medication with the patient in clinic today.  - Plan to continue to monitor with blood work.  - VITAMIN D,25 HYDROXY; Future    5. Tobacco dependence  Smoking cessation counseling: 10 minutes including discussion of various products available to assist with this endeavor including nicoderm patch, gum, chantix, wellbutrin, and etc.  Discussion of triggers to smoke and reasons to quit.  Discussed stepped-down approach and support groups available to assist with smoking cessation. Patient is not interested in cessation at this time.     IMagnolia (Ellieibe), am scribing for, and in the presence of, Jitendra Mckay M.D..    Electronically signed by: Magnolia Chase (Ivette), 3/4/2020    Jitendra TEMPLE M.D., personally performed the services described in this documentation, as scribed by Magnolia Chase in my presence, and it is both accurate and complete.

## 2020-09-14 ENCOUNTER — OFFICE VISIT (OUTPATIENT)
Dept: MEDICAL GROUP | Age: 65
End: 2020-09-14
Payer: MEDICARE

## 2020-09-14 VITALS
WEIGHT: 179.2 LBS | TEMPERATURE: 97.4 F | OXYGEN SATURATION: 95 % | HEIGHT: 69 IN | BODY MASS INDEX: 26.54 KG/M2 | DIASTOLIC BLOOD PRESSURE: 84 MMHG | SYSTOLIC BLOOD PRESSURE: 122 MMHG | HEART RATE: 54 BPM

## 2020-09-14 DIAGNOSIS — E78.2 MIXED HYPERLIPIDEMIA: ICD-10-CM

## 2020-09-14 DIAGNOSIS — F17.200 TOBACCO DEPENDENCE: ICD-10-CM

## 2020-09-14 DIAGNOSIS — E03.4 HYPOTHYROIDISM DUE TO ACQUIRED ATROPHY OF THYROID: ICD-10-CM

## 2020-09-14 DIAGNOSIS — E55.9 VITAMIN D DEFICIENCY: ICD-10-CM

## 2020-09-14 PROCEDURE — 99214 OFFICE O/P EST MOD 30 MIN: CPT | Performed by: INTERNAL MEDICINE

## 2020-09-14 ASSESSMENT — ENCOUNTER SYMPTOMS
NEUROLOGICAL NEGATIVE: 1
MUSCULOSKELETAL NEGATIVE: 1
RESPIRATORY NEGATIVE: 1
CONSTITUTIONAL NEGATIVE: 1
PSYCHIATRIC NEGATIVE: 1
EYES NEGATIVE: 1
CARDIOVASCULAR NEGATIVE: 1
GASTROINTESTINAL NEGATIVE: 1

## 2020-09-14 ASSESSMENT — FIBROSIS 4 INDEX: FIB4 SCORE: 1.412253459473551387

## 2020-09-15 NOTE — PROGRESS NOTES
"Subjective:      Raymundo Vega is a 65 y.o. male who presents with Follow-Up, Lab Results, and Hip Pain (right, off and on for a few months )  The patient is here for followup of chronic medical problems listed below. The patient is compliant with medications and having no side effects from them. Denies chest pain, abdominal pain, dyspnea, myalgias, or cough.   Patient Active Problem List    Diagnosis Date Noted   • Vitamin D deficiency disease 12/11/2013   • Hypothyroidism due to acquired atrophy of thyroid 04/04/2013   • Tobacco dependence 04/04/2013   • Mixed hyperlipidemia 01/07/2013     /a  Allergies   Allergen Reactions   • Crestor [Rosuvastatin Calcium]      Myalgias, patient believes it was \"lovasatin\" that causes this      • Zetia [Ezetimibe]      myalgia       Outpatient Medications Prior to Visit   Medication Sig Dispense Refill   • Glucosamine HCl-MSM (MSM GLUCOSAMINE PO) Take  by mouth.     • aspirin EC (ECOTRIN) 81 MG Tablet Delayed Response Take 81 mg by mouth every day.     • rosuvastatin (CRESTOR) 40 MG tablet Take 1 Tab by mouth every day. 100 Tab 4   • levothyroxine (SYNTHROID) 88 MCG Tab Take 1 Tab by mouth Every morning on an empty stomach. 90 Tab 4   • Cholecalciferol (VITAMIN D-3) 5000 UNITS TABS Take 5,000 Units by mouth every day. 100 Tab 3     No facility-administered medications prior to visit.      No visits with results within 1 Month(s) from this visit.   Latest known visit with results is:   Orders Only on 09/18/2019   Component Date Value   • WBC 09/18/2019 11.3*   • RBC 09/18/2019 5.09    • Hemoglobin 09/18/2019 16.2    • Hematocrit 09/18/2019 48.3    • MCV 09/18/2019 95    • MCH 09/18/2019 31.8    • MCHC 09/18/2019 33.5    • RDW 09/18/2019 14.3    • Platelet Count 09/18/2019 209    • Neutrophils-Polys 09/18/2019 65    • Lymphocytes 09/18/2019 23    • Monocytes 09/18/2019 9    • Eosinophils 09/18/2019 2    • Basophils 09/18/2019 0    • Immature Cells 09/18/2019 CANCELED    • " Neutrophils (Absolute) 09/18/2019 7.4*   • Lymphs (Absolute) 09/18/2019 2.6    • Monos (Absolute) 09/18/2019 1.0*   • Eos (Absolute) 09/18/2019 0.2    • Baso (Absolute) 09/18/2019 0.1    • Immature Granulocytes 09/18/2019 1    • Immature Granulocytes (a* 09/18/2019 0.1    • Nucleated RBC 09/18/2019 CANCELED    • Comments-Diff 09/18/2019 CANCELED    • Glucose 09/18/2019 104*   • Bun 09/18/2019 15    • Creatinine 09/18/2019 1.16    • GFR If Non  Ameri* 09/18/2019 66    • GFR If  09/18/2019 76    • Bun-Creatinine Ratio 09/18/2019 13    • Sodium 09/18/2019 143    • Potassium 09/18/2019 4.5    • Chloride 09/18/2019 107*   • Co2 09/18/2019 21    • Calcium 09/18/2019 9.5    • Total Protein 09/18/2019 6.9    • Albumin 09/18/2019 4.3    • Globulin 09/18/2019 2.6    • A-G Ratio 09/18/2019 1.7    • Total Bilirubin 09/18/2019 0.3    • Alkaline Phosphatase 09/18/2019 66    • AST(SGOT) 09/18/2019 30    • ALT(SGPT) 09/18/2019 45*   • Cholesterol,Tot 09/18/2019 151    • Triglycerides 09/18/2019 79    • HDL 09/18/2019 60    • VLDL Cholesterol Calc 09/18/2019 16    • LDL 09/18/2019 75    • Comment: 09/18/2019 CANCELED    • TSH 09/18/2019 4.900*      No results found for: HBA1C  Lab Results   Component Value Date/Time    SODIUM 143 09/18/2019 04:40 AM    SODIUM 138 09/02/2015 09:04 AM    POTASSIUM 4.5 09/18/2019 04:40 AM    POTASSIUM 4.3 09/02/2015 09:04 AM    CHLORIDE 107 (H) 09/18/2019 04:40 AM    CHLORIDE 110 09/02/2015 09:04 AM    CO2 21 09/18/2019 04:40 AM    CO2 22 09/02/2015 09:04 AM    GLUCOSE 104 (H) 09/18/2019 04:40 AM    GLUCOSE 104 (H) 09/02/2015 09:04 AM    BUN 15 09/18/2019 04:40 AM    BUN 18 09/02/2015 09:04 AM    CREATININE 1.16 09/18/2019 04:40 AM    CREATININE 1.06 09/02/2015 09:04 AM    BUNCREATRAT 13 09/18/2019 04:40 AM    ALKPHOSPHAT 66 09/18/2019 04:40 AM    ALKPHOSPHAT 57 09/02/2015 09:04 AM    ASTSGOT 30 09/18/2019 04:40 AM    ASTSGOT 19 09/02/2015 09:04 AM    ALTSGPT 45 (H) 09/18/2019  "04:40 AM    ALTSGPT 26 09/02/2015 09:04 AM    TBILIRUBIN 0.3 09/18/2019 04:40 AM    TBILIRUBIN 0.4 09/02/2015 09:04 AM     No results found for: INR  Lab Results   Component Value Date/Time    CHOLSTRLTOT 151 09/18/2019 04:40 AM    CHOLSTRLTOT 226 (H) 09/02/2015 09:04 AM    LDL 75 09/18/2019 04:40 AM     (H) 09/02/2015 09:04 AM    HDL 60 09/18/2019 04:40 AM    HDL 54 09/02/2015 09:04 AM    TRIGLYCERIDE 79 09/18/2019 04:40 AM    TRIGLYCERIDE 92 09/02/2015 09:04 AM       No results found for: TESTOSTERONE  Lab Results   Component Value Date/Time    TSH 4.900 (H) 09/18/2019 04:40 AM     Lab Results   Component Value Date/Time    FREET4 0.96 09/02/2015 09:04 AM    FREET4 0.93 11/05/2014 09:05 AM     Lab Results   Component Value Date/Time    URICACID 5.6 12/24/2012 08:46 AM     No components found for: VITB12  Lab Results   Component Value Date/Time    25HYDROXY 46 09/02/2015 09:04 AM    25HYDROXY 57 11/05/2014 09:05 AM               HPI    Review of Systems   Constitutional: Negative.    HENT: Negative.    Eyes: Negative.    Respiratory: Negative.    Cardiovascular: Negative.    Gastrointestinal: Negative.    Genitourinary: Negative.    Musculoskeletal: Negative.    Skin: Negative.    Neurological: Negative.    Endo/Heme/Allergies: Negative.    Psychiatric/Behavioral: Negative.           Objective:     /84 (BP Location: Left arm, Patient Position: Sitting, BP Cuff Size: Adult)   Pulse (!) 54   Temp 36.3 °C (97.4 °F) (Temporal)   Ht 1.753 m (5' 9\")   Wt 81.3 kg (179 lb 3.2 oz)   SpO2 95%   BMI 26.46 kg/m²      Physical Exam  Constitutional:       General: He is not in acute distress.     Appearance: He is well-developed. He is not diaphoretic.   HENT:      Head: Normocephalic and atraumatic.      Right Ear: External ear normal.      Left Ear: External ear normal.      Nose: Nose normal.      Mouth/Throat:      Pharynx: No oropharyngeal exudate.   Eyes:      General: No scleral icterus.        Right " eye: No discharge.         Left eye: No discharge.      Conjunctiva/sclera: Conjunctivae normal.      Pupils: Pupils are equal, round, and reactive to light.   Neck:      Musculoskeletal: Normal range of motion and neck supple.      Thyroid: No thyromegaly.      Vascular: No JVD.      Trachea: No tracheal deviation.   Cardiovascular:      Rate and Rhythm: Normal rate and regular rhythm.      Heart sounds: Normal heart sounds. No murmur. No friction rub. No gallop.    Pulmonary:      Effort: Pulmonary effort is normal. No respiratory distress.      Breath sounds: Normal breath sounds. No stridor. No wheezing or rales.   Chest:      Chest wall: No tenderness.   Abdominal:      General: Bowel sounds are normal. There is no distension.      Palpations: Abdomen is soft. There is no mass.      Tenderness: There is no abdominal tenderness. There is no guarding or rebound.   Musculoskeletal: Normal range of motion.         General: No tenderness.   Lymphadenopathy:      Cervical: No cervical adenopathy.   Skin:     General: Skin is warm and dry.      Coloration: Skin is not pale.      Findings: No erythema or rash.   Neurological:      Mental Status: He is alert and oriented to person, place, and time.      Motor: No abnormal muscle tone.      Coordination: Coordination normal.      Deep Tendon Reflexes: Reflexes are normal and symmetric. Reflexes normal.   Psychiatric:         Behavior: Behavior normal.         Thought Content: Thought content normal.         Judgment: Judgment normal.                  Assessment/Plan:        1. Mixed hyperlipidemia    Under good control. Continue same regimen.- TSH; Future  - Comp Metabolic Panel; Future  - Lipid Profile; Future  - CBC WITH DIFFERENTIAL; Future    2. Hypothyroidism due to acquired atrophy of thyroid     Under good control. Continue same regimen- TSH; Future    3. Vitamin D deficiency disease     Under good control. Continue same regimen- VITAMIN D,25 HYDROXY; Future    4.  Tobacco dependence      Patient counseled. Encouraged to quit tobacco products. NicoDerm prescribed.

## 2020-12-14 DIAGNOSIS — E03.4 HYPOTHYROIDISM DUE TO ACQUIRED ATROPHY OF THYROID: ICD-10-CM

## 2020-12-14 DIAGNOSIS — E78.2 MIXED HYPERLIPIDEMIA: ICD-10-CM

## 2020-12-14 RX ORDER — LEVOTHYROXINE SODIUM 88 UG/1
88 TABLET ORAL
Qty: 90 TAB | Refills: 0 | Status: SHIPPED | OUTPATIENT
Start: 2020-12-14 | End: 2021-03-15 | Stop reason: SDUPTHER

## 2020-12-14 RX ORDER — ROSUVASTATIN CALCIUM 40 MG/1
40 TABLET, COATED ORAL DAILY
Qty: 90 TAB | Refills: 0 | Status: SHIPPED | OUTPATIENT
Start: 2020-12-14 | End: 2021-03-15 | Stop reason: SDUPTHER

## 2021-03-03 DIAGNOSIS — Z23 NEED FOR VACCINATION: ICD-10-CM

## 2021-03-12 ENCOUNTER — TELEPHONE (OUTPATIENT)
Dept: MEDICAL GROUP | Age: 66
End: 2021-03-12

## 2021-03-12 NOTE — TELEPHONE ENCOUNTER
Left message for patient to call back regarding pre-visit planning. Please transfer call to [phone].

## 2021-03-15 ENCOUNTER — OFFICE VISIT (OUTPATIENT)
Dept: MEDICAL GROUP | Age: 66
End: 2021-03-15
Payer: MEDICARE

## 2021-03-15 VITALS
HEART RATE: 65 BPM | SYSTOLIC BLOOD PRESSURE: 116 MMHG | HEIGHT: 69 IN | DIASTOLIC BLOOD PRESSURE: 80 MMHG | OXYGEN SATURATION: 94 % | BODY MASS INDEX: 26.13 KG/M2 | TEMPERATURE: 97.5 F | WEIGHT: 176.4 LBS

## 2021-03-15 DIAGNOSIS — E78.2 MIXED HYPERLIPIDEMIA: ICD-10-CM

## 2021-03-15 DIAGNOSIS — E55.9 VITAMIN D DEFICIENCY: ICD-10-CM

## 2021-03-15 DIAGNOSIS — E03.4 HYPOTHYROIDISM DUE TO ACQUIRED ATROPHY OF THYROID: ICD-10-CM

## 2021-03-15 DIAGNOSIS — D48.5 NEOPLASM OF UNCERTAIN BEHAVIOR OF SKIN OF FACE: ICD-10-CM

## 2021-03-15 PROCEDURE — 99214 OFFICE O/P EST MOD 30 MIN: CPT | Performed by: INTERNAL MEDICINE

## 2021-03-15 RX ORDER — LEVOTHYROXINE SODIUM 88 UG/1
88 TABLET ORAL
Qty: 90 TABLET | Refills: 0 | Status: SHIPPED | OUTPATIENT
Start: 2021-03-15 | End: 2021-06-11 | Stop reason: SDUPTHER

## 2021-03-15 RX ORDER — ROSUVASTATIN CALCIUM 40 MG/1
40 TABLET, COATED ORAL DAILY
Qty: 90 TABLET | Refills: 0 | Status: SHIPPED | OUTPATIENT
Start: 2021-03-15 | End: 2021-06-11 | Stop reason: SDUPTHER

## 2021-03-15 ASSESSMENT — ENCOUNTER SYMPTOMS
RESPIRATORY NEGATIVE: 1
PSYCHIATRIC NEGATIVE: 1
MUSCULOSKELETAL NEGATIVE: 1
NEUROLOGICAL NEGATIVE: 1
CARDIOVASCULAR NEGATIVE: 1
CONSTITUTIONAL NEGATIVE: 1
GASTROINTESTINAL NEGATIVE: 1
EYES NEGATIVE: 1

## 2021-03-15 ASSESSMENT — FIBROSIS 4 INDEX: FIB4 SCORE: 1.412253459473551387

## 2021-03-15 ASSESSMENT — PATIENT HEALTH QUESTIONNAIRE - PHQ9: CLINICAL INTERPRETATION OF PHQ2 SCORE: 0

## 2021-03-15 NOTE — PROGRESS NOTES
"Subjective:      Raymundo Vega is a 65 y.o. male who presents with Hyperlipidemia (6 mo F/V )  The patient is here for followup of chronic medical problems listed below. The patient is compliant with medications and having no side effects from them. Denies chest pain, abdominal pain, dyspnea, myalgias, or cough.   Patient Active Problem List    Diagnosis Date Noted   • Vitamin D deficiency disease 12/11/2013   • Hypothyroidism due to acquired atrophy of thyroid 04/04/2013   • Tobacco dependence 04/04/2013   • Mixed hyperlipidemia 01/07/2013     Outpatient Medications Prior to Visit   Medication Sig Dispense Refill   • Glucosamine HCl-MSM (MSM GLUCOSAMINE PO) Take  by mouth.     • aspirin EC (ECOTRIN) 81 MG Tablet Delayed Response Take 81 mg by mouth every day.     • Cholecalciferol (VITAMIN D-3) 5000 UNITS TABS Take 5,000 Units by mouth every day. 100 Tab 3   • levothyroxine (SYNTHROID) 88 MCG Tab Take 1 Tab by mouth Every morning on an empty stomach. 90 Tab 0   • rosuvastatin (CRESTOR) 40 MG tablet Take 1 Tab by mouth every day. 90 Tab 0     No facility-administered medications prior to visit.     Allergies   Allergen Reactions   • Crestor [Rosuvastatin Calcium]      Myalgias, patient believes it was \"lovasatin\" that causes this      • Zetia [Ezetimibe]      myalgia                   HPI    Review of Systems   Constitutional: Negative.    HENT: Negative.    Eyes: Negative.    Respiratory: Negative.    Cardiovascular: Negative.    Gastrointestinal: Negative.    Genitourinary: Negative.    Musculoskeletal: Negative.    Skin: Negative.    Neurological: Negative.    Endo/Heme/Allergies: Negative.    Psychiatric/Behavioral: Negative.           Objective:     /80 (BP Location: Left arm, Patient Position: Sitting, BP Cuff Size: Adult)   Pulse 65   Temp 36.4 °C (97.5 °F) (Temporal)   Ht 1.753 m (5' 9\")   Wt 80 kg (176 lb 6.4 oz)   SpO2 94%   BMI 26.05 kg/m²      Physical Exam  Constitutional:       " General: He is not in acute distress.     Appearance: He is well-developed. He is not diaphoretic.   HENT:      Head: Normocephalic and atraumatic.      Right Ear: External ear normal.      Left Ear: External ear normal.      Nose: Nose normal.      Mouth/Throat:      Pharynx: No oropharyngeal exudate.   Eyes:      General: No scleral icterus.        Right eye: No discharge.         Left eye: No discharge.      Conjunctiva/sclera: Conjunctivae normal.      Pupils: Pupils are equal, round, and reactive to light.   Neck:      Thyroid: No thyromegaly.      Vascular: No JVD.      Trachea: No tracheal deviation.   Cardiovascular:      Rate and Rhythm: Normal rate and regular rhythm.      Heart sounds: Normal heart sounds. No murmur. No friction rub. No gallop.    Pulmonary:      Effort: Pulmonary effort is normal. No respiratory distress.      Breath sounds: Normal breath sounds. No stridor. No wheezing or rales.   Chest:      Chest wall: No tenderness.   Abdominal:      General: Bowel sounds are normal. There is no distension.      Palpations: Abdomen is soft. There is no mass.      Tenderness: There is no abdominal tenderness. There is no guarding or rebound.   Musculoskeletal:         General: No tenderness. Normal range of motion.      Cervical back: Normal range of motion and neck supple.   Lymphadenopathy:      Cervical: No cervical adenopathy.   Skin:     General: Skin is warm and dry.      Coloration: Skin is not pale.      Findings: Lesion present. No erythema or rash.      Comments: 1 cm scaly pearly eruption erythematous with central excoriation right side of face.   Neurological:      Mental Status: He is alert and oriented to person, place, and time.      Motor: No abnormal muscle tone.      Coordination: Coordination normal.      Deep Tendon Reflexes: Reflexes are normal and symmetric. Reflexes normal.   Psychiatric:         Behavior: Behavior normal.         Thought Content: Thought content normal.          Judgment: Judgment normal.                 Assessment/Plan:        1. Hypothyroidism due to acquired atrophy of thyroid    Under good control. Continue same regimen.'  - TSH; Future  - levothyroxine (SYNTHROID) 88 MCG Tab; Take 1 tablet by mouth Every morning on an empty stomach.  Dispense: 90 tablet; Refill: 0    2. Mixed hyperlipidemia   Under good control. Continue same regimen.'    - TSH; Future  - Comp Metabolic Panel; Future  - Lipid Profile; Future  - CBC WITH DIFFERENTIAL; Future  - rosuvastatin (CRESTOR) 40 MG tablet; Take 1 tablet by mouth every day.  Dispense: 90 tablet; Refill: 0    3. Vitamin D deficiency   Under good control. Continue same regimen.'    - VITAMIN D,25 HYDROXY; Future    4. Neoplasm of uncertain behavior of skin of face-rule out BCCA-rule out BCCA.  Looks very suspicious.  Right cheek.           - REFERRAL TO DERMATOLOGY

## 2021-06-11 DIAGNOSIS — E03.4 HYPOTHYROIDISM DUE TO ACQUIRED ATROPHY OF THYROID: ICD-10-CM

## 2021-06-11 DIAGNOSIS — E78.2 MIXED HYPERLIPIDEMIA: ICD-10-CM

## 2021-06-11 RX ORDER — LEVOTHYROXINE SODIUM 88 UG/1
88 TABLET ORAL
Qty: 90 TABLET | Refills: 3 | Status: SHIPPED | OUTPATIENT
Start: 2021-06-11 | End: 2022-03-28

## 2021-06-11 RX ORDER — ROSUVASTATIN CALCIUM 40 MG/1
40 TABLET, COATED ORAL DAILY
Qty: 90 TABLET | Refills: 3 | Status: SHIPPED | OUTPATIENT
Start: 2021-06-11 | End: 2022-09-26 | Stop reason: SDUPTHER

## 2021-09-27 ENCOUNTER — OFFICE VISIT (OUTPATIENT)
Dept: MEDICAL GROUP | Age: 66
End: 2021-09-27
Payer: MEDICARE

## 2021-09-27 VITALS
DIASTOLIC BLOOD PRESSURE: 80 MMHG | HEIGHT: 69 IN | OXYGEN SATURATION: 94 % | BODY MASS INDEX: 25.48 KG/M2 | TEMPERATURE: 98.2 F | WEIGHT: 172 LBS | HEART RATE: 80 BPM | SYSTOLIC BLOOD PRESSURE: 130 MMHG

## 2021-09-27 DIAGNOSIS — E78.2 MIXED HYPERLIPIDEMIA: ICD-10-CM

## 2021-09-27 DIAGNOSIS — E55.9 VITAMIN D DEFICIENCY: ICD-10-CM

## 2021-09-27 DIAGNOSIS — Z23 NEED FOR VACCINATION: ICD-10-CM

## 2021-09-27 DIAGNOSIS — E03.4 HYPOTHYROIDISM DUE TO ACQUIRED ATROPHY OF THYROID: ICD-10-CM

## 2021-09-27 DIAGNOSIS — Z11.59 NEED FOR HEPATITIS C SCREENING TEST: ICD-10-CM

## 2021-09-27 PROCEDURE — 99214 OFFICE O/P EST MOD 30 MIN: CPT | Mod: 25 | Performed by: INTERNAL MEDICINE

## 2021-09-27 PROCEDURE — 90662 IIV NO PRSV INCREASED AG IM: CPT | Performed by: INTERNAL MEDICINE

## 2021-09-27 PROCEDURE — G0008 ADMIN INFLUENZA VIRUS VAC: HCPCS | Performed by: INTERNAL MEDICINE

## 2021-09-27 ASSESSMENT — ENCOUNTER SYMPTOMS
CARDIOVASCULAR NEGATIVE: 1
GASTROINTESTINAL NEGATIVE: 1
RESPIRATORY NEGATIVE: 1
NEUROLOGICAL NEGATIVE: 1
EYES NEGATIVE: 1
PSYCHIATRIC NEGATIVE: 1
CONSTITUTIONAL NEGATIVE: 1
MUSCULOSKELETAL NEGATIVE: 1

## 2021-09-27 NOTE — PROGRESS NOTES
Claudia Vega is a 66 y.o. male who presents with Lab Results    The patient is here for followup of chronic medical problems listed below. The patient is compliant with medications and having no side effects from them. Denies chest pain, abdominal pain, dyspnea, myalgias, or cough.   Patient Active Problem List    Diagnosis Date Noted   • Vitamin D deficiency disease 12/11/2013   • Hypothyroidism due to acquired atrophy of thyroid 04/04/2013   • Tobacco dependence 04/04/2013   • Mixed hyperlipidemia 01/07/2013     Allergies   Allergen Reactions   • Zetia [Ezetimibe]      myalgia       Outpatient Medications Prior to Visit   Medication Sig Dispense Refill   • levothyroxine (SYNTHROID) 88 MCG Tab Take 1 tablet by mouth every morning on an empty stomach. 90 tablet 3   • rosuvastatin (CRESTOR) 40 MG tablet Take 1 tablet by mouth every day. 90 tablet 3   • Glucosamine HCl-MSM (MSM GLUCOSAMINE PO) Take  by mouth.     • aspirin EC (ECOTRIN) 81 MG Tablet Delayed Response Take 81 mg by mouth every day.     • Cholecalciferol (VITAMIN D-3) 5000 UNITS TABS Take 5,000 Units by mouth every day. 100 Tab 3     No facility-administered medications prior to visit.     No visits with results within 1 Month(s) from this visit.   Latest known visit with results is:   Orders Only on 09/18/2019   Component Date Value   • WBC 09/18/2019 11.3*   • RBC 09/18/2019 5.09    • Hemoglobin 09/18/2019 16.2    • Hematocrit 09/18/2019 48.3    • MCV 09/18/2019 95    • MCH 09/18/2019 31.8    • MCHC 09/18/2019 33.5    • RDW 09/18/2019 14.3    • Platelet Count 09/18/2019 209    • Neutrophils-Polys 09/18/2019 65    • Lymphocytes 09/18/2019 23    • Monocytes 09/18/2019 9    • Eosinophils 09/18/2019 2    • Basophils 09/18/2019 0    • Immature Cells 09/18/2019 CANCELED    • Neutrophils (Absolute) 09/18/2019 7.4*   • Lymphs (Absolute) 09/18/2019 2.6    • Monos (Absolute) 09/18/2019 1.0*   • Eos (Absolute) 09/18/2019 0.2    • Baso  (Absolute) 09/18/2019 0.1    • Immature Granulocytes 09/18/2019 1    • Immature Granulocytes (a* 09/18/2019 0.1    • Nucleated RBC 09/18/2019 CANCELED    • Comments-Diff 09/18/2019 CANCELED    • Glucose 09/18/2019 104*   • Bun 09/18/2019 15    • Creatinine 09/18/2019 1.16    • GFR If Non  Ameri* 09/18/2019 66    • GFR If  09/18/2019 76    • Bun-Creatinine Ratio 09/18/2019 13    • Sodium 09/18/2019 143    • Potassium 09/18/2019 4.5    • Chloride 09/18/2019 107*   • Co2 09/18/2019 21    • Calcium 09/18/2019 9.5    • Total Protein 09/18/2019 6.9    • Albumin 09/18/2019 4.3    • Globulin 09/18/2019 2.6    • A-G Ratio 09/18/2019 1.7    • Total Bilirubin 09/18/2019 0.3    • Alkaline Phosphatase 09/18/2019 66    • AST(SGOT) 09/18/2019 30    • ALT(SGPT) 09/18/2019 45*   • Cholesterol,Tot 09/18/2019 151    • Triglycerides 09/18/2019 79    • HDL 09/18/2019 60    • VLDL Cholesterol Calc 09/18/2019 16    • LDL 09/18/2019 75    • Comment: 09/18/2019 CANCELED    • TSH 09/18/2019 4.900*      \  No results found for: HBA1C  Lab Results   Component Value Date/Time    SODIUM 143 09/18/2019 04:40 AM    SODIUM 138 09/02/2015 09:04 AM    POTASSIUM 4.5 09/18/2019 04:40 AM    POTASSIUM 4.3 09/02/2015 09:04 AM    CHLORIDE 107 (H) 09/18/2019 04:40 AM    CHLORIDE 110 09/02/2015 09:04 AM    CO2 21 09/18/2019 04:40 AM    CO2 22 09/02/2015 09:04 AM    GLUCOSE 104 (H) 09/18/2019 04:40 AM    GLUCOSE 104 (H) 09/02/2015 09:04 AM    BUN 15 09/18/2019 04:40 AM    BUN 18 09/02/2015 09:04 AM    CREATININE 1.16 09/18/2019 04:40 AM    CREATININE 1.06 09/02/2015 09:04 AM    BUNCREATRAT 13 09/18/2019 04:40 AM    ALKPHOSPHAT 66 09/18/2019 04:40 AM    ALKPHOSPHAT 57 09/02/2015 09:04 AM    ASTSGOT 30 09/18/2019 04:40 AM    ASTSGOT 19 09/02/2015 09:04 AM    ALTSGPT 45 (H) 09/18/2019 04:40 AM    ALTSGPT 26 09/02/2015 09:04 AM    TBILIRUBIN 0.3 09/18/2019 04:40 AM    TBILIRUBIN 0.4 09/02/2015 09:04 AM     No results found for: INR  Lab  "Results   Component Value Date/Time    CHOLSTRLTOT 151 09/18/2019 04:40 AM    CHOLSTRLTOT 226 (H) 09/02/2015 09:04 AM    LDL 75 09/18/2019 04:40 AM     (H) 09/02/2015 09:04 AM    HDL 60 09/18/2019 04:40 AM    HDL 54 09/02/2015 09:04 AM    TRIGLYCERIDE 79 09/18/2019 04:40 AM    TRIGLYCERIDE 92 09/02/2015 09:04 AM       No results found for: TESTOSTERONE  Lab Results   Component Value Date/Time    TSH 4.900 (H) 09/18/2019 04:40 AM     Lab Results   Component Value Date/Time    FREET4 0.96 09/02/2015 09:04 AM    FREET4 0.93 11/05/2014 09:05 AM     Lab Results   Component Value Date/Time    URICACID 5.6 12/24/2012 08:46 AM     No components found for: VITB12  Lab Results   Component Value Date/Time    25HYDROXY 46 09/02/2015 09:04 AM    25HYDROXY 57 11/05/2014 09:05 AM             HPI    Review of Systems   Constitutional: Negative.    HENT: Negative.    Eyes: Negative.    Respiratory: Negative.    Cardiovascular: Negative.    Gastrointestinal: Negative.    Genitourinary: Negative.    Musculoskeletal: Negative.    Skin: Negative.    Neurological: Negative.    Endo/Heme/Allergies: Negative.    Psychiatric/Behavioral: Negative.               Objective     /80 (BP Location: Left arm, Patient Position: Sitting, BP Cuff Size: Adult)   Pulse 80   Temp 36.8 °C (98.2 °F) (Temporal)   Ht 1.753 m (5' 9\")   Wt 78 kg (172 lb)   SpO2 94%   BMI 25.40 kg/m²      Physical Exam  Constitutional:       General: He is not in acute distress.     Appearance: He is well-developed. He is not diaphoretic.   HENT:      Head: Normocephalic and atraumatic.      Right Ear: External ear normal.      Left Ear: External ear normal.      Nose: Nose normal.      Mouth/Throat:      Pharynx: No oropharyngeal exudate.   Eyes:      General: No scleral icterus.        Right eye: No discharge.         Left eye: No discharge.      Conjunctiva/sclera: Conjunctivae normal.      Pupils: Pupils are equal, round, and reactive to light.   Neck:    "   Thyroid: No thyromegaly.      Vascular: No JVD.      Trachea: No tracheal deviation.   Cardiovascular:      Rate and Rhythm: Normal rate and regular rhythm.      Heart sounds: Normal heart sounds. No murmur heard.   No friction rub. No gallop.    Pulmonary:      Effort: Pulmonary effort is normal. No respiratory distress.      Breath sounds: Normal breath sounds. No stridor. No wheezing or rales.   Chest:      Chest wall: No tenderness.   Abdominal:      General: Bowel sounds are normal. There is no distension.      Palpations: Abdomen is soft. There is no mass.      Tenderness: There is no abdominal tenderness. There is no guarding or rebound.   Musculoskeletal:         General: No tenderness. Normal range of motion.      Cervical back: Normal range of motion and neck supple.   Lymphadenopathy:      Cervical: No cervical adenopathy.   Skin:     General: Skin is warm and dry.      Coloration: Skin is not pale.      Findings: No erythema or rash.   Neurological:      Mental Status: He is alert and oriented to person, place, and time.      Motor: No abnormal muscle tone.      Coordination: Coordination normal.      Deep Tendon Reflexes: Reflexes are normal and symmetric. Reflexes normal.   Psychiatric:         Behavior: Behavior normal.         Thought Content: Thought content normal.         Judgment: Judgment normal.                             Assessment & Plan        1. Need for vaccination      - INFLUENZA VACCINE, HIGH DOSE (65+ ONLY)    2. Need for hepatitis C screening test      - HCV Scrn ( [id number] 1xLife); Future    3. Hypothyroidism due to acquired atrophy of thyroid   Under good control. Continue same regimen.]\  - TSH; Future    4. Mixed hyperlipidemia   Under good control. Continue same regimen.    - Comp Metabolic Panel; Future  - Lipid Profile; Future  - CBC WITH DIFFERENTIAL; Future    5. Vitamin D deficiency  Under good control. Continue same regimen.]

## 2022-03-28 ENCOUNTER — OFFICE VISIT (OUTPATIENT)
Dept: MEDICAL GROUP | Age: 67
End: 2022-03-28
Payer: MEDICARE

## 2022-03-28 VITALS
WEIGHT: 167 LBS | SYSTOLIC BLOOD PRESSURE: 120 MMHG | HEIGHT: 69 IN | DIASTOLIC BLOOD PRESSURE: 68 MMHG | OXYGEN SATURATION: 97 % | BODY MASS INDEX: 24.73 KG/M2 | TEMPERATURE: 97 F | RESPIRATION RATE: 16 BRPM | HEART RATE: 60 BPM

## 2022-03-28 DIAGNOSIS — R21 FACIAL RASH: ICD-10-CM

## 2022-03-28 DIAGNOSIS — L72.3 SEBACEOUS CYST: ICD-10-CM

## 2022-03-28 DIAGNOSIS — E03.4 HYPOTHYROIDISM DUE TO ACQUIRED ATROPHY OF THYROID: ICD-10-CM

## 2022-03-28 DIAGNOSIS — E78.2 MIXED HYPERLIPIDEMIA: ICD-10-CM

## 2022-03-28 DIAGNOSIS — E55.9 VITAMIN D DEFICIENCY: ICD-10-CM

## 2022-03-28 DIAGNOSIS — Z23 NEED FOR VACCINATION: ICD-10-CM

## 2022-03-28 PROCEDURE — 99214 OFFICE O/P EST MOD 30 MIN: CPT | Performed by: INTERNAL MEDICINE

## 2022-03-28 RX ORDER — LEVOTHYROXINE SODIUM 0.1 MG/1
100 TABLET ORAL
Qty: 90 TABLET | Refills: 4 | Status: SHIPPED | OUTPATIENT
Start: 2022-03-28 | End: 2023-04-04 | Stop reason: SDUPTHER

## 2022-03-28 ASSESSMENT — ENCOUNTER SYMPTOMS
CONSTITUTIONAL NEGATIVE: 1
CARDIOVASCULAR NEGATIVE: 1
NEUROLOGICAL NEGATIVE: 1
GASTROINTESTINAL NEGATIVE: 1
MUSCULOSKELETAL NEGATIVE: 1
PSYCHIATRIC NEGATIVE: 1
EYES NEGATIVE: 1
RESPIRATORY NEGATIVE: 1

## 2022-03-28 NOTE — PROGRESS NOTES
Claudia Vega is a 66 y.o. male who presents with Follow-Up  The patient is here for followup of chronic medical problems listed below. The patient is compliant with medications and having no side effects from them. Denies chest pain, abdominal pain, dyspnea, myalgias, or cough.   Patient Active Problem List    Diagnosis Date Noted   • Vitamin D deficiency disease 12/11/2013   • Hypothyroidism due to acquired atrophy of thyroid 04/04/2013   • Tobacco dependence 04/04/2013   • Mixed hyperlipidemia 01/07/2013     Allergies   Allergen Reactions   • Zetia [Ezetimibe]      myalgia       Patient Active Problem List    Diagnosis Date Noted   • Vitamin D deficiency disease 12/11/2013   • Hypothyroidism due to acquired atrophy of thyroid 04/04/2013   • Tobacco dependence 04/04/2013   • Mixed hyperlipidemia 01/07/2013     No visits with results within 1 Month(s) from this visit.   Latest known visit with results is:   Orders Only on 09/18/2019   Component Date Value   • WBC 09/18/2019 11.3 (A)   • RBC 09/18/2019 5.09    • Hemoglobin 09/18/2019 16.2    • Hematocrit 09/18/2019 48.3    • MCV 09/18/2019 95    • MCH 09/18/2019 31.8    • MCHC 09/18/2019 33.5    • RDW 09/18/2019 14.3    • Platelet Count 09/18/2019 209    • Neutrophils-Polys 09/18/2019 65    • Lymphocytes 09/18/2019 23    • Monocytes 09/18/2019 9    • Eosinophils 09/18/2019 2    • Basophils 09/18/2019 0    • Immature Cells 09/18/2019 CANCELED    • Neutrophils (Absolute) 09/18/2019 7.4 (A)   • Lymphs (Absolute) 09/18/2019 2.6    • Monos (Absolute) 09/18/2019 1.0 (A)   • Eos (Absolute) 09/18/2019 0.2    • Baso (Absolute) 09/18/2019 0.1    • Immature Granulocytes 09/18/2019 1    • Immature Granulocytes (a* 09/18/2019 0.1    • Nucleated RBC 09/18/2019 CANCELED    • Comments-Diff 09/18/2019 CANCELED    • Glucose 09/18/2019 104 (A)   • Bun 09/18/2019 15    • Creatinine 09/18/2019 1.16    • GFR If Non  Ameri* 09/18/2019 66    • GFR If   American 09/18/2019 76    • Bun-Creatinine Ratio 09/18/2019 13    • Sodium 09/18/2019 143    • Potassium 09/18/2019 4.5    • Chloride 09/18/2019 107 (A)   • Co2 09/18/2019 21    • Calcium 09/18/2019 9.5    • Total Protein 09/18/2019 6.9    • Albumin 09/18/2019 4.3    • Globulin 09/18/2019 2.6    • A-G Ratio 09/18/2019 1.7    • Total Bilirubin 09/18/2019 0.3    • Alkaline Phosphatase 09/18/2019 66    • AST(SGOT) 09/18/2019 30    • ALT(SGPT) 09/18/2019 45 (A)   • Cholesterol,Tot 09/18/2019 151    • Triglycerides 09/18/2019 79    • HDL 09/18/2019 60    • VLDL Cholesterol Calc 09/18/2019 16    • LDL 09/18/2019 75    • Comment: 09/18/2019 CANCELED    • TSH 09/18/2019 4.900 (A)      No results found for: HBA1C  Lab Results   Component Value Date/Time    SODIUM 143 09/18/2019 04:40 AM    SODIUM 138 09/02/2015 09:04 AM    POTASSIUM 4.5 09/18/2019 04:40 AM    POTASSIUM 4.3 09/02/2015 09:04 AM    CHLORIDE 107 (H) 09/18/2019 04:40 AM    CHLORIDE 110 09/02/2015 09:04 AM    CO2 21 09/18/2019 04:40 AM    CO2 22 09/02/2015 09:04 AM    GLUCOSE 104 (H) 09/18/2019 04:40 AM    GLUCOSE 104 (H) 09/02/2015 09:04 AM    BUN 15 09/18/2019 04:40 AM    BUN 18 09/02/2015 09:04 AM    CREATININE 1.16 09/18/2019 04:40 AM    CREATININE 1.06 09/02/2015 09:04 AM    BUNCREATRAT 13 09/18/2019 04:40 AM    ALKPHOSPHAT 66 09/18/2019 04:40 AM    ALKPHOSPHAT 57 09/02/2015 09:04 AM    ASTSGOT 30 09/18/2019 04:40 AM    ASTSGOT 19 09/02/2015 09:04 AM    ALTSGPT 45 (H) 09/18/2019 04:40 AM    ALTSGPT 26 09/02/2015 09:04 AM    TBILIRUBIN 0.3 09/18/2019 04:40 AM    TBILIRUBIN 0.4 09/02/2015 09:04 AM     No results found for: INR  Lab Results   Component Value Date/Time    CHOLSTRLTOT 151 09/18/2019 04:40 AM    CHOLSTRLTOT 226 (H) 09/02/2015 09:04 AM    LDL 75 09/18/2019 04:40 AM     (H) 09/02/2015 09:04 AM    HDL 60 09/18/2019 04:40 AM    HDL 54 09/02/2015 09:04 AM    TRIGLYCERIDE 79 09/18/2019 04:40 AM    TRIGLYCERIDE 92 09/02/2015 09:04 AM       No  "results found for: TESTOSTERONE  Lab Results   Component Value Date/Time    TSH 4.900 (H) 09/18/2019 04:40 AM     Lab Results   Component Value Date/Time    FREET4 0.96 09/02/2015 09:04 AM    FREET4 0.93 11/05/2014 09:05 AM     Lab Results   Component Value Date/Time    URICACID 5.6 12/24/2012 08:46 AM     No components found for: VITB12  Lab Results   Component Value Date/Time    25HYDROXY 46 09/02/2015 09:04 AM    25HYDROXY 57 11/05/2014 09:05 AM   '          HPI    Review of Systems   Constitutional: Negative.    HENT: Negative.    Eyes: Negative.    Respiratory: Negative.    Cardiovascular: Negative.    Gastrointestinal: Negative.    Genitourinary: Negative.    Musculoskeletal: Negative.    Skin: Negative.    Neurological: Negative.    Endo/Heme/Allergies: Negative.    Psychiatric/Behavioral: Negative.               Objective     /68 (BP Location: Left arm, Patient Position: Sitting, BP Cuff Size: Adult)   Pulse 60   Temp 36.1 °C (97 °F) (Temporal)   Resp 16   Ht 1.753 m (5' 9\")   Wt 75.8 kg (167 lb)   SpO2 97%   BMI 24.66 kg/m²      Physical Exam  Constitutional:       General: He is not in acute distress.     Appearance: He is well-developed. He is not diaphoretic.   HENT:      Head: Normocephalic and atraumatic.      Right Ear: External ear normal.      Left Ear: External ear normal.      Nose: Nose normal.      Mouth/Throat:      Pharynx: No oropharyngeal exudate.   Eyes:      General: No scleral icterus.        Right eye: No discharge.         Left eye: No discharge.      Conjunctiva/sclera: Conjunctivae normal.      Pupils: Pupils are equal, round, and reactive to light.   Neck:      Thyroid: No thyromegaly.      Vascular: No JVD.      Trachea: No tracheal deviation.   Cardiovascular:      Rate and Rhythm: Normal rate and regular rhythm.      Heart sounds: Normal heart sounds. No murmur heard.    No friction rub. No gallop.   Pulmonary:      Effort: Pulmonary effort is normal. No respiratory " distress.      Breath sounds: Normal breath sounds. No stridor. No wheezing or rales.   Chest:      Chest wall: No tenderness.   Abdominal:      General: Bowel sounds are normal. There is no distension.      Palpations: Abdomen is soft. There is no mass.      Tenderness: There is no abdominal tenderness. There is no guarding or rebound.   Musculoskeletal:         General: No tenderness. Normal range of motion.      Cervical back: Normal range of motion and neck supple.      Comments: Patient is a nontender 4 cm sebaceous cyst on his left upper back that he wants removed.  Is it does cause him irritation from time to time.  Referral placed   Lymphadenopathy:      Cervical: No cervical adenopathy.   Skin:     General: Skin is warm and dry.      Coloration: Skin is not pale.      Findings: Rash present. No erythema.      Comments: Patient has erythematous blotches and papules and pustules on the nasal bridge and cheeks.  Consistent with possible rosacea.  Refer to dermatology   Neurological:      Mental Status: He is alert and oriented to person, place, and time.      Motor: No abnormal muscle tone.      Coordination: Coordination normal.      Deep Tendon Reflexes: Reflexes are normal and symmetric. Reflexes normal.   Psychiatric:         Behavior: Behavior normal.         Thought Content: Thought content normal.         Judgment: Judgment normal.            Lab review from [facility] which shows mild elevation of ALT and AST consistent with fatty liver and/or alcohol consumption.  He drinks 4 beers a day on average.  Advised to cut back to no more than 2 a day or abstain completely.  Hep C testing was negative.     Otherwise rest of the labs including CBC CHEM panel lipid panel were unremarkable, except for TSH which was slightly elevated and for which we will increase his Synthroid from 88 mg a day to 100 mg a day.             Assessment & Plan        1. Hypothyroidism due to acquired atrophy of thyroid    Not at goal.   See above.. Synthroid increased as below  - TSH; Future  - levothyroxine (SYNTHROID) 100 MCG Tab; Take 1 Tablet by mouth every morning on an empty stomach.  Dispense: 90 Tablet; Refill: 4    2. Mixed hyperlipidemia    Under good control. Continue same regimen.'  - Comp Metabolic Panel; Future  - Lipid Profile; Future  - CBC WITH DIFFERENTIAL; Future    3. Vitamin D deficiency     Under good control. Continue same regimen.'- VITAMIN D,25 HYDROXY; Future    4. Need for vaccination     - Zoster Vac Recomb Adjuvanted (SHINGRIX) 50 MCG/0.5ML Recon Susp; Inject 0.5 mL into the shoulder, thigh, or buttocks one time for 1 dose.  Dispense: 0.5 mL; Refill: 0    5. Facial rash  -Possible rosacea.  Refer to dermatology second opinion  - Referral to Dermatology    6. Sebaceous cyst  This is causing severe irritation from time to time and patient would therefore like it excised.  Referral placed  - Referral to General Surgery

## 2022-09-26 ENCOUNTER — OFFICE VISIT (OUTPATIENT)
Dept: MEDICAL GROUP | Age: 67
End: 2022-09-26
Payer: MEDICARE

## 2022-09-26 VITALS
RESPIRATION RATE: 16 BRPM | SYSTOLIC BLOOD PRESSURE: 120 MMHG | WEIGHT: 164 LBS | TEMPERATURE: 97.4 F | HEART RATE: 60 BPM | OXYGEN SATURATION: 95 % | BODY MASS INDEX: 24.29 KG/M2 | HEIGHT: 69 IN | DIASTOLIC BLOOD PRESSURE: 76 MMHG

## 2022-09-26 DIAGNOSIS — E03.4 HYPOTHYROIDISM DUE TO ACQUIRED ATROPHY OF THYROID: ICD-10-CM

## 2022-09-26 DIAGNOSIS — N40.0 BPH WITHOUT URINARY OBSTRUCTION: ICD-10-CM

## 2022-09-26 DIAGNOSIS — F17.200 TOBACCO DEPENDENCE: ICD-10-CM

## 2022-09-26 DIAGNOSIS — Z23 NEED FOR VACCINATION: ICD-10-CM

## 2022-09-26 DIAGNOSIS — Z13.6 SCREENING FOR AAA (ABDOMINAL AORTIC ANEURYSM): ICD-10-CM

## 2022-09-26 DIAGNOSIS — E78.2 MIXED HYPERLIPIDEMIA: ICD-10-CM

## 2022-09-26 DIAGNOSIS — R73.01 IFG (IMPAIRED FASTING GLUCOSE): ICD-10-CM

## 2022-09-26 DIAGNOSIS — E55.9 VITAMIN D DEFICIENCY: ICD-10-CM

## 2022-09-26 PROCEDURE — G0010 ADMIN HEPATITIS B VACCINE: HCPCS | Performed by: INTERNAL MEDICINE

## 2022-09-26 PROCEDURE — 90662 IIV NO PRSV INCREASED AG IM: CPT | Performed by: INTERNAL MEDICINE

## 2022-09-26 PROCEDURE — G0008 ADMIN INFLUENZA VIRUS VAC: HCPCS | Performed by: INTERNAL MEDICINE

## 2022-09-26 PROCEDURE — 99214 OFFICE O/P EST MOD 30 MIN: CPT | Mod: 25 | Performed by: INTERNAL MEDICINE

## 2022-09-26 PROCEDURE — 90746 HEPB VACCINE 3 DOSE ADULT IM: CPT | Performed by: INTERNAL MEDICINE

## 2022-09-26 RX ORDER — ROSUVASTATIN CALCIUM 40 MG/1
40 TABLET, COATED ORAL DAILY
Qty: 90 TABLET | Refills: 4 | Status: SHIPPED | OUTPATIENT
Start: 2022-09-26 | End: 2023-04-04 | Stop reason: SDUPTHER

## 2022-09-26 ASSESSMENT — ENCOUNTER SYMPTOMS
CARDIOVASCULAR NEGATIVE: 1
CONSTITUTIONAL NEGATIVE: 1
PSYCHIATRIC NEGATIVE: 1
RESPIRATORY NEGATIVE: 1
GASTROINTESTINAL NEGATIVE: 1
EYES NEGATIVE: 1
NEUROLOGICAL NEGATIVE: 1
MUSCULOSKELETAL NEGATIVE: 1

## 2022-09-26 ASSESSMENT — PATIENT HEALTH QUESTIONNAIRE - PHQ9: CLINICAL INTERPRETATION OF PHQ2 SCORE: 0

## 2022-09-26 NOTE — PROGRESS NOTES
Claudia Vega is a 67 y.o. male who presents with Follow-Up (Lab review )  The patient is here for followup of chronic medical problems listed below. The patient is compliant with medications and having no side effects from them. Denies chest pain, abdominal pain, dyspnea, myalgias, or cough.   Patient Active Problem List    Diagnosis Date Noted   • Vitamin D deficiency disease 12/11/2013   • Hypothyroidism due to acquired atrophy of thyroid 04/04/2013   • Tobacco dependence 04/04/2013   • Mixed hyperlipidemia 01/07/2013     Outpatient Medications Prior to Visit   Medication Sig Dispense Refill   • levothyroxine (SYNTHROID) 100 MCG Tab Take 1 Tablet by mouth every morning on an empty stomach. 90 Tablet 4   • rosuvastatin (CRESTOR) 40 MG tablet Take 1 tablet by mouth every day. 90 tablet 3   • Glucosamine HCl-MSM (MSM GLUCOSAMINE PO) Take  by mouth.     • aspirin EC (ECOTRIN) 81 MG Tablet Delayed Response Take 81 mg by mouth every day.     • Cholecalciferol (VITAMIN D-3) 5000 UNITS TABS Take 5,000 Units by mouth every day. 100 Tab 3     No facility-administered medications prior to visit.     Allergies   Allergen Reactions   • Zetia [Ezetimibe]      myalgia       No visits with results within 1 Month(s) from this visit.   Latest known visit with results is:   Orders Only on 09/18/2019   Component Date Value   • WBC 09/18/2019 11.3 (A)   • RBC 09/18/2019 5.09    • Hemoglobin 09/18/2019 16.2    • Hematocrit 09/18/2019 48.3    • MCV 09/18/2019 95    • MCH 09/18/2019 31.8    • MCHC 09/18/2019 33.5    • RDW 09/18/2019 14.3    • Platelet Count 09/18/2019 209    • Neutrophils-Polys 09/18/2019 65    • Lymphocytes 09/18/2019 23    • Monocytes 09/18/2019 9    • Eosinophils 09/18/2019 2    • Basophils 09/18/2019 0    • Immature Cells 09/18/2019 CANCELED    • Neutrophils (Absolute) 09/18/2019 7.4 (A)   • Lymphs (Absolute) 09/18/2019 2.6    • Monos (Absolute) 09/18/2019 1.0 (A)   • Eos (Absolute) 09/18/2019  0.2    • Baso (Absolute) 09/18/2019 0.1    • Immature Granulocytes 09/18/2019 1    • Immature Granulocytes (a* 09/18/2019 0.1    • Nucleated RBC 09/18/2019 CANCELED    • Comments-Diff 09/18/2019 CANCELED    • Glucose 09/18/2019 104 (A)   • Bun 09/18/2019 15    • Creatinine 09/18/2019 1.16    • GFR If Non  Ameri* 09/18/2019 66    • GFR If  09/18/2019 76    • Bun-Creatinine Ratio 09/18/2019 13    • Sodium 09/18/2019 143    • Potassium 09/18/2019 4.5    • Chloride 09/18/2019 107 (A)   • Co2 09/18/2019 21    • Calcium 09/18/2019 9.5    • Total Protein 09/18/2019 6.9    • Albumin 09/18/2019 4.3    • Globulin 09/18/2019 2.6    • A-G Ratio 09/18/2019 1.7    • Total Bilirubin 09/18/2019 0.3    • Alkaline Phosphatase 09/18/2019 66    • AST(SGOT) 09/18/2019 30    • ALT(SGPT) 09/18/2019 45 (A)   • Cholesterol,Tot 09/18/2019 151    • Triglycerides 09/18/2019 79    • HDL 09/18/2019 60    • VLDL Cholesterol Calc 09/18/2019 16    • LDL 09/18/2019 75    • Comment: 09/18/2019 CANCELED    • TSH 09/18/2019 4.900 (A)      .alll  No results found for: HBA1C  Lab Results   Component Value Date/Time    SODIUM 143 09/18/2019 04:40 AM    SODIUM 138 09/02/2015 09:04 AM    POTASSIUM 4.5 09/18/2019 04:40 AM    POTASSIUM 4.3 09/02/2015 09:04 AM    CHLORIDE 107 (H) 09/18/2019 04:40 AM    CHLORIDE 110 09/02/2015 09:04 AM    CO2 21 09/18/2019 04:40 AM    CO2 22 09/02/2015 09:04 AM    GLUCOSE 104 (H) 09/18/2019 04:40 AM    GLUCOSE 104 (H) 09/02/2015 09:04 AM    BUN 15 09/18/2019 04:40 AM    BUN 18 09/02/2015 09:04 AM    CREATININE 1.16 09/18/2019 04:40 AM    CREATININE 1.06 09/02/2015 09:04 AM    BUNCREATRAT 13 09/18/2019 04:40 AM    ALKPHOSPHAT 66 09/18/2019 04:40 AM    ALKPHOSPHAT 57 09/02/2015 09:04 AM    ASTSGOT 30 09/18/2019 04:40 AM    ASTSGOT 19 09/02/2015 09:04 AM    ALTSGPT 45 (H) 09/18/2019 04:40 AM    ALTSGPT 26 09/02/2015 09:04 AM    TBILIRUBIN 0.3 09/18/2019 04:40 AM    TBILIRUBIN 0.4 09/02/2015 09:04 AM     No  "results found for: INR  Lab Results   Component Value Date/Time    CHOLSTRLTOT 151 09/18/2019 04:40 AM    CHOLSTRLTOT 226 (H) 09/02/2015 09:04 AM    LDL 75 09/18/2019 04:40 AM     (H) 09/02/2015 09:04 AM    HDL 60 09/18/2019 04:40 AM    HDL 54 09/02/2015 09:04 AM    TRIGLYCERIDE 79 09/18/2019 04:40 AM    TRIGLYCERIDE 92 09/02/2015 09:04 AM       No results found for: TESTOSTERONE  Lab Results   Component Value Date/Time    TSH 4.900 (H) 09/18/2019 04:40 AM     Lab Results   Component Value Date/Time    FREET4 0.96 09/02/2015 09:04 AM    FREET4 0.93 11/05/2014 09:05 AM     Lab Results   Component Value Date/Time    URICACID 5.6 12/24/2012 08:46 AM     No components found for: VITB12  Lab Results   Component Value Date/Time    25HYDROXY 46 09/02/2015 09:04 AM    25HYDROXY 57 11/05/2014 09:05 AM               HPI    Review of Systems   Constitutional: Negative.    HENT: Negative.     Eyes: Negative.    Respiratory: Negative.     Cardiovascular: Negative.    Gastrointestinal: Negative.    Genitourinary: Negative.    Musculoskeletal: Negative.    Skin: Negative.    Neurological: Negative.    Endo/Heme/Allergies: Negative.    Psychiatric/Behavioral: Negative.                Objective     /76 (BP Location: Right arm, Patient Position: Sitting, BP Cuff Size: Adult)   Pulse 60   Temp 36.3 °C (97.4 °F) (Temporal)   Resp 16   Ht 1.753 m (5' 9\")   Wt 74.4 kg (164 lb)   SpO2 95%   BMI 24.22 kg/m²      Physical Exam  Constitutional:       General: He is not in acute distress.     Appearance: He is well-developed. He is not diaphoretic.   HENT:      Head: Normocephalic and atraumatic.      Right Ear: External ear normal.      Left Ear: External ear normal.      Nose: Nose normal.      Mouth/Throat:      Pharynx: No oropharyngeal exudate.   Eyes:      General: No scleral icterus.        Right eye: No discharge.         Left eye: No discharge.      Conjunctiva/sclera: Conjunctivae normal.      Pupils: Pupils are " equal, round, and reactive to light.   Neck:      Thyroid: No thyromegaly.      Vascular: No JVD.      Trachea: No tracheal deviation.   Cardiovascular:      Rate and Rhythm: Normal rate and regular rhythm.      Heart sounds: Normal heart sounds. No murmur heard.    No friction rub. No gallop.   Pulmonary:      Effort: Pulmonary effort is normal. No respiratory distress.      Breath sounds: Normal breath sounds. No stridor. No wheezing or rales.   Chest:      Chest wall: No tenderness.   Abdominal:      General: Bowel sounds are normal. There is no distension.      Palpations: Abdomen is soft. There is no mass.      Tenderness: There is no abdominal tenderness. There is no guarding or rebound.   Musculoskeletal:         General: No tenderness. Normal range of motion.      Cervical back: Normal range of motion and neck supple.   Lymphadenopathy:      Cervical: No cervical adenopathy.   Skin:     General: Skin is warm and dry.      Coloration: Skin is not pale.      Findings: No erythema or rash.   Neurological:      Mental Status: He is alert and oriented to person, place, and time.      Motor: No abnormal muscle tone.      Coordination: Coordination normal.      Deep Tendon Reflexes: Reflexes are normal and symmetric. Reflexes normal.   Psychiatric:         Behavior: Behavior normal.         Thought Content: Thought content normal.         Judgment: Judgment normal.                           Assessment & Plan        1. Mixed hyperlipidemia   Under good control. Continue same regimen.'  2. Hypothyroidism due to acquired atrophy of thyroid   Under good control. Continue same regimen.      3. Vitamin D deficiency disease   Under good control. Continue same regimen.      4. Tobacco dependence  Patient counseled. Encouraged to quit tobacco products. NicoDerm prescribed.

## 2022-11-02 ENCOUNTER — PATIENT MESSAGE (OUTPATIENT)
Dept: HEALTH INFORMATION MANAGEMENT | Facility: OTHER | Age: 67
End: 2022-11-02

## 2022-11-28 ENCOUNTER — APPOINTMENT (OUTPATIENT)
Dept: MEDICAL GROUP | Age: 67
End: 2022-11-28
Payer: MEDICARE

## 2023-03-28 LAB — HBA1C MFR BLD: 5.7 % (ref ?–5.8)

## 2023-04-04 ENCOUNTER — OFFICE VISIT (OUTPATIENT)
Dept: MEDICAL GROUP | Age: 68
End: 2023-04-04
Payer: MEDICARE

## 2023-04-04 VITALS
WEIGHT: 161 LBS | OXYGEN SATURATION: 98 % | DIASTOLIC BLOOD PRESSURE: 76 MMHG | BODY MASS INDEX: 23.85 KG/M2 | HEIGHT: 69 IN | HEART RATE: 71 BPM | SYSTOLIC BLOOD PRESSURE: 122 MMHG | TEMPERATURE: 98 F

## 2023-04-04 DIAGNOSIS — E03.4 HYPOTHYROIDISM DUE TO ACQUIRED ATROPHY OF THYROID: ICD-10-CM

## 2023-04-04 DIAGNOSIS — Z23 NEED FOR VACCINATION: ICD-10-CM

## 2023-04-04 DIAGNOSIS — E55.9 VITAMIN D DEFICIENCY: ICD-10-CM

## 2023-04-04 DIAGNOSIS — E78.2 MIXED HYPERLIPIDEMIA: ICD-10-CM

## 2023-04-04 DIAGNOSIS — R73.01 IFG (IMPAIRED FASTING GLUCOSE): ICD-10-CM

## 2023-04-04 DIAGNOSIS — N40.0 BPH WITHOUT URINARY OBSTRUCTION: ICD-10-CM

## 2023-04-04 DIAGNOSIS — F17.200 TOBACCO DEPENDENCE: ICD-10-CM

## 2023-04-04 PROCEDURE — 99214 OFFICE O/P EST MOD 30 MIN: CPT | Performed by: INTERNAL MEDICINE

## 2023-04-04 RX ORDER — LEVOTHYROXINE SODIUM 0.1 MG/1
100 TABLET ORAL
Qty: 90 TABLET | Refills: 4 | Status: SHIPPED | OUTPATIENT
Start: 2023-04-04 | End: 2023-10-03 | Stop reason: SDUPTHER

## 2023-04-04 RX ORDER — ROSUVASTATIN CALCIUM 40 MG/1
40 TABLET, COATED ORAL DAILY
Qty: 90 TABLET | Refills: 4 | Status: SHIPPED | OUTPATIENT
Start: 2023-04-04 | End: 2023-10-03 | Stop reason: SDUPTHER

## 2023-04-04 ASSESSMENT — PATIENT HEALTH QUESTIONNAIRE - PHQ9: CLINICAL INTERPRETATION OF PHQ2 SCORE: 0

## 2023-04-04 ASSESSMENT — ENCOUNTER SYMPTOMS
NEUROLOGICAL NEGATIVE: 1
EYES NEGATIVE: 1
CONSTITUTIONAL NEGATIVE: 1
MUSCULOSKELETAL NEGATIVE: 1
PSYCHIATRIC NEGATIVE: 1
CARDIOVASCULAR NEGATIVE: 1
GASTROINTESTINAL NEGATIVE: 1
RESPIRATORY NEGATIVE: 1

## 2023-04-04 NOTE — PROGRESS NOTES
"Claudia Vega is a 67 y.o. male who presents with Follow-Up (Lab review )  The patient is here for followup of chronic medical problems listed below. The patient is compliant with medications and having no side effects from them. Denies chest pain, abdominal pain, dyspnea, myalgias, or cough.   Patient Active Problem List   Diagnosis    Mixed hyperlipidemia    Hypothyroidism due to acquired atrophy of thyroid    Tobacco dependence    Vitamin D deficiency disease     Outpatient Medications Prior to Visit   Medication Sig Dispense Refill    Glucosamine HCl-MSM (MSM GLUCOSAMINE PO) Take  by mouth.      aspirin EC (ECOTRIN) 81 MG Tablet Delayed Response Take 81 mg by mouth every day.      Cholecalciferol (VITAMIN D-3) 5000 UNITS TABS Take 5,000 Units by mouth every day. 100 Tab 3    rosuvastatin (CRESTOR) 40 MG tablet Take 1 Tablet by mouth every day. 90 Tablet 4    levothyroxine (SYNTHROID) 100 MCG Tab Take 1 Tablet by mouth every morning on an empty stomach. 90 Tablet 4     No facility-administered medications prior to visit.     Allergies   Allergen Reactions    Zetia [Ezetimibe]      myalgia       Abstract on 04/02/2023   Component Date Value    Glycohemoglobin 03/28/2023 5.7                 HPI    Review of Systems   Constitutional: Negative.    HENT: Negative.     Eyes: Negative.    Respiratory: Negative.     Cardiovascular: Negative.    Gastrointestinal: Negative.    Genitourinary: Negative.    Musculoskeletal: Negative.    Skin: Negative.    Neurological: Negative.    Endo/Heme/Allergies: Negative.    Psychiatric/Behavioral: Negative.              Objective     /76 (BP Location: Left arm, Patient Position: Sitting, BP Cuff Size: Adult)   Pulse 71   Temp 36.7 °C (98 °F) (Temporal)   Ht 1.753 m (5' 9\")   Wt 73 kg (161 lb)   SpO2 98%   BMI 23.78 kg/m²      Physical Exam  Constitutional:       General: He is not in acute distress.     Appearance: He is well-developed. He is not " diaphoretic.   HENT:      Head: Normocephalic and atraumatic.      Right Ear: External ear normal.      Left Ear: External ear normal.      Nose: Nose normal.      Mouth/Throat:      Pharynx: No oropharyngeal exudate.   Eyes:      General: No scleral icterus.        Right eye: No discharge.         Left eye: No discharge.      Conjunctiva/sclera: Conjunctivae normal.      Pupils: Pupils are equal, round, and reactive to light.   Neck:      Thyroid: No thyromegaly.      Vascular: No JVD.      Trachea: No tracheal deviation.   Cardiovascular:      Rate and Rhythm: Normal rate and regular rhythm.      Heart sounds: Normal heart sounds. No murmur heard.    No friction rub. No gallop.   Pulmonary:      Effort: Pulmonary effort is normal. No respiratory distress.      Breath sounds: Normal breath sounds. No stridor. No wheezing or rales.   Chest:      Chest wall: No tenderness.   Abdominal:      General: Bowel sounds are normal. There is no distension.      Palpations: Abdomen is soft. There is no mass.      Tenderness: There is no abdominal tenderness. There is no guarding or rebound.   Musculoskeletal:         General: No tenderness. Normal range of motion.      Cervical back: Normal range of motion and neck supple.   Lymphadenopathy:      Cervical: No cervical adenopathy.   Skin:     General: Skin is warm and dry.      Coloration: Skin is not pale.      Findings: No erythema or rash.   Neurological:      Mental Status: He is alert and oriented to person, place, and time.      Motor: No abnormal muscle tone.      Coordination: Coordination normal.      Deep Tendon Reflexes: Reflexes are normal and symmetric. Reflexes normal.   Psychiatric:         Behavior: Behavior normal.         Thought Content: Thought content normal.         Judgment: Judgment normal.                           Assessment & Plan        1. Mixed hyperlipidemia    Under good control. Continue same regimen.'  - rosuvastatin (CRESTOR) 40 MG tablet; Take  1 Tablet by mouth every day.  Dispense: 90 Tablet; Refill: 4  - CBC WITH DIFFERENTIAL; Future  - TSH; Future  - Lipid Profile; Future  - Comp Metabolic Panel; Future    2. Hypothyroidism due to acquired atrophy of thyroid   Under good control. Continue same regimen.'  - rosuvastatin (CRESTOR) 40 MG tablet; Take 1 Tablet by mouth every day.  Dispense: 90 Tablet; Refill: 4  - levothyroxine (SYNTHROID) 100 MCG Tab; Take 1 Tablet by mouth every morning on an empty stomach.  Dispense: 90 Tablet; Refill: 4  - TSH; Future    3. Tobacco dependence      Patient counseled. Encouraged to quit tobacco products. NicoDerm prescribed.      4. Vitamin D deficiency disease    Under good control. Continue same regimen.'  1000 units of vitamin D3 daily.  - VITAMIN D,25 HYDROXY (DEFICIENCY); Future    5. IFG (impaired fasting glucose)  Mediterranean diet    - HEMOGLOBIN A1C; Future    6. BPH without urinary obstruction     - PROSTATE SPECIFIC AG DIAGNOSTIC; Future

## 2023-09-25 LAB — HBA1C MFR BLD: 5.5 % (ref ?–5.8)

## 2023-10-03 ENCOUNTER — OFFICE VISIT (OUTPATIENT)
Dept: MEDICAL GROUP | Age: 68
End: 2023-10-03
Payer: MEDICARE

## 2023-10-03 VITALS
HEART RATE: 54 BPM | SYSTOLIC BLOOD PRESSURE: 136 MMHG | OXYGEN SATURATION: 96 % | DIASTOLIC BLOOD PRESSURE: 80 MMHG | TEMPERATURE: 97.5 F

## 2023-10-03 DIAGNOSIS — Z13.6 SCREENING FOR AAA (ABDOMINAL AORTIC ANEURYSM): ICD-10-CM

## 2023-10-03 DIAGNOSIS — M75.51 CHRONIC SHOULDER BURSITIS, RIGHT: ICD-10-CM

## 2023-10-03 DIAGNOSIS — R73.01 IFG (IMPAIRED FASTING GLUCOSE): ICD-10-CM

## 2023-10-03 DIAGNOSIS — E03.4 HYPOTHYROIDISM DUE TO ACQUIRED ATROPHY OF THYROID: ICD-10-CM

## 2023-10-03 DIAGNOSIS — Z23 NEED FOR VACCINATION: ICD-10-CM

## 2023-10-03 DIAGNOSIS — E55.9 VITAMIN D DEFICIENCY DISEASE: ICD-10-CM

## 2023-10-03 DIAGNOSIS — N40.0 BPH WITHOUT URINARY OBSTRUCTION: ICD-10-CM

## 2023-10-03 DIAGNOSIS — Z00.00 MEDICARE ANNUAL WELLNESS VISIT, SUBSEQUENT: ICD-10-CM

## 2023-10-03 DIAGNOSIS — E78.2 MIXED HYPERLIPIDEMIA: ICD-10-CM

## 2023-10-03 PROBLEM — D48.19 NEOPLASM OF UNCERTAIN BEHAVIOR OF CONNECTIVE AND OTHER SOFT TISSUE: Status: ACTIVE | Noted: 2022-05-03

## 2023-10-03 PROCEDURE — 90632 HEPA VACCINE ADULT IM: CPT | Performed by: INTERNAL MEDICINE

## 2023-10-03 PROCEDURE — 90662 IIV NO PRSV INCREASED AG IM: CPT | Performed by: INTERNAL MEDICINE

## 2023-10-03 PROCEDURE — G0439 PPPS, SUBSEQ VISIT: HCPCS | Mod: 25 | Performed by: INTERNAL MEDICINE

## 2023-10-03 PROCEDURE — G0008 ADMIN INFLUENZA VIRUS VAC: HCPCS | Performed by: INTERNAL MEDICINE

## 2023-10-03 PROCEDURE — 90472 IMMUNIZATION ADMIN EACH ADD: CPT | Performed by: INTERNAL MEDICINE

## 2023-10-03 PROCEDURE — 3079F DIAST BP 80-89 MM HG: CPT | Performed by: INTERNAL MEDICINE

## 2023-10-03 PROCEDURE — 3075F SYST BP GE 130 - 139MM HG: CPT | Performed by: INTERNAL MEDICINE

## 2023-10-03 RX ORDER — ROSUVASTATIN CALCIUM 40 MG/1
40 TABLET, COATED ORAL DAILY
Qty: 90 TABLET | Refills: 4 | Status: SHIPPED | OUTPATIENT
Start: 2023-10-03

## 2023-10-03 RX ORDER — LEVOTHYROXINE SODIUM 0.1 MG/1
100 TABLET ORAL
Qty: 90 TABLET | Refills: 4 | Status: SHIPPED | OUTPATIENT
Start: 2023-10-03

## 2023-10-03 ASSESSMENT — ENCOUNTER SYMPTOMS: GENERAL WELL-BEING: GOOD

## 2023-10-03 ASSESSMENT — ACTIVITIES OF DAILY LIVING (ADL): BATHING_REQUIRES_ASSISTANCE: 0

## 2023-10-03 ASSESSMENT — PATIENT HEALTH QUESTIONNAIRE - PHQ9: CLINICAL INTERPRETATION OF PHQ2 SCORE: 0

## 2023-10-03 NOTE — PROGRESS NOTES
Chief Complaint   Patient presents with    Annual Exam     AWV lab review        HPI:  Hans Vega is a 68 y.o. here for Medicare Annual Wellness Visit   And  The patient is here for followup of chronic medical problems listed below. The patient is compliant with medications and having no side effects from them. Denies chest pain, abdominal pain, dyspnea, myalgias, or cough.     Patient also continued complaint of right shoulder pain for the last 3 months after working in the yard extensively this summer.  Gets slightly better with Aleve or Advil once a day.  Also applying ice alternating with heat with some relief but not completely resolved.  Has pain with range of motion rated 5 out of 10 and wants it further evaluated.  Patient Active Problem List    Diagnosis Date Noted    Vitamin D deficiency disease 12/11/2013    Hypothyroidism due to acquired atrophy of thyroid 04/04/2013    Tobacco dependence 04/04/2013    Mixed hyperlipidemia 01/07/2013       Current Outpatient Medications   Medication Sig Dispense Refill    rosuvastatin (CRESTOR) 40 MG tablet Take 1 Tablet by mouth every day. 90 Tablet 4    levothyroxine (SYNTHROID) 100 MCG Tab Take 1 Tablet by mouth every morning on an empty stomach. 90 Tablet 4    Glucosamine HCl-MSM (MSM GLUCOSAMINE PO) Take  by mouth.      aspirin EC (ECOTRIN) 81 MG Tablet Delayed Response Take 81 mg by mouth every day.      Cholecalciferol (VITAMIN D-3) 5000 UNITS TABS Take 5,000 Units by mouth every day. 100 Tab 3     No current facility-administered medications for this visit.          Current supplements as per medication list.     Allergies: Zetia [ezetimibe]    Current social contact/activities: yes     He  reports that he has been smoking pipe. He has never used smokeless tobacco. He reports current alcohol use of about 12.6 oz of alcohol per week. He reports that he does not use drugs.  Ready to quit: Not Answered  Counseling given: Not Answered      ROS:    Gait:  Uses no assistive device  Ostomy: No  Other tubes: No  Amputations: No  Chronic oxygen use: No  Last eye exam: 1 year ago  Wears hearing aids: No   : Denies any urinary leakage during the last 6 months    Screening:    Depression Screening  Little interest or pleasure in doing things?  0 - not at all  Feeling down, depressed , or hopeless? 0 - not at all  Patient Health Questionnaire Score: 0     If depressive symptoms identified deferred to follow up visit unless specifically addressed in assessment and plan.    Interpretation of PHQ-9 Total Score   Score Severity   1-4 No Depression   5-9 Mild Depression   10-14 Moderate Depression   15-19 Moderately Severe Depression   20-27 Severe Depression    Screening for Cognitive Impairment  Do you or any of your friends or family members have any concern about your memory? No  Three Minute Recall (Banana, Sunrise, Chair) 3/3    Teddy clock face with all 12 numbers and set the hands to show 20 past 8.  Yes    Cognitive concerns identified deferred for follow up unless specifically addressed in assessment and plan.    Fall Risk Assessment  Has the patient had two or more falls in the last year or any fall with injury in the last year?  No    Safety Assessment  Do you always wear your seatbelt?  Yes  Any changes to home needed to function safely? No  Difficulty hearing.  No  Patient counseled about all safety risks that were identified.    Functional Assessment ADLs  Are there any barriers preventing you from cooking for yourself or meeting nutritional needs?  No.    Are there any barriers preventing you from driving safely or obtaining transportation?  No.    Are there any barriers preventing you from using a telephone or calling for help?  No    Are there any barriers preventing you from shopping?  No.    Are there any barriers preventing you from taking care of your own finances?  No    Are there any barriers preventing you from managing your medications?  No    Are there  any barriers preventing you from showering, bathing or dressing yourself? No    Are there any barriers preventing you from doing housework or laundry? No  Are there any barriers preventing you from using the toilet?No  Are you currently engaging in any exercise or physical activity?  Yes.      Self-Assessment of Health  What is your perception of your health? Good  Do you sleep more than six hours a night? Yes  In the past 7 days, how much did pain keep you from doing your normal work? None  Do you spend quality time with family or friends (virtually or in person)? Yes  Do you usually eat a heart healthy diet that constists of a variety of fruits, vegetables, whole grains and fiber? Yes  Do you eat foods high in fat and/or Fast Food more than three times per week? No    Advance Care Planning  Do you have an Advance Directive, Living Will, Durable Power of , or POLST? No                 Health Maintenance Summary            Overdue - Hepatitis A Vaccine (Hep A) (1 of 2 - Risk 2-dose series) Overdue - never done      No completion history exists for this topic.              Overdue - Zoster (Shingles) Vaccines (1 of 2) Overdue - never done      No completion history exists for this topic.              Overdue - Abdominal Aortic Aneurysm (AAA) Screening (Once) Overdue - never done      No completion history exists for this topic.              Overdue - COVID-19 Vaccine (4 - Moderna series) Overdue since 1/22/2022 11/27/2021  Imm Admin: MODERNA SARS-COV-2 VACCINE (12+)    04/14/2021  Imm Admin: MODERNA SARS-COV-2 VACCINE (12+)    03/16/2021  Imm Admin: MODERNA SARS-COV-2 VACCINE (12+)              Ordered - Hepatitis B Vaccine (Hep B) (2 of 3 - 19+ 3-dose series) Ordered on 4/4/2023 09/26/2022  Imm Admin: Hepatitis B Vaccine (Adol/Adult)              Ordered - Pneumococcal Vaccine: 65+ Years (3 - PPSV23 or PCV20) Ordered on 4/4/2023 09/25/2019  Imm Admin: Pneumococcal Conjugate Vaccine  (Prevnar/PCV-13)    03/21/2018  Imm Admin: Pneumococcal polysaccharide vaccine (PPSV-23)              Ordered - Influenza Vaccine (1) Ordered on 10/3/2023      09/26/2022  Imm Admin: Influenza Vaccine Adult HD    09/27/2021  Imm Admin: Influenza Vaccine Adult HD    10/31/2020  Imm Admin: Influenza Vaccine Adult HD    09/25/2019  Imm Admin: Influenza Vaccine Adult HD    09/25/2018  Imm Admin: Influenza Vaccine Quad Inj (Pf)    Only the first 5 history entries have been loaded, but more history exists.              Colorectal Cancer Screening (Colonoscopy - Every 10 Years) Tentatively due on 7/30/2024 07/30/2014  AMB REFERRAL TO GI FOR COLONOSCOPY    12/24/2012  OCCULT BLOOD FECES IMMUNOASSAY              Annual Wellness Visit (Every 366 Days) Next due on 10/3/2024      10/03/2023  Visit Dx: Medicare annual wellness visit, subsequent              IMM DTaP/Tdap/Td Vaccine (2 - Td or Tdap) Next due on 3/21/2028      03/21/2018  Imm Admin: Tdap Vaccine    07/03/2010  Imm Admin: TD Vaccine              Hepatitis C Screening  Tentatively Complete      03/19/2022  HEPATITIS C RNA QUANT.              HPV Vaccines (Series Information) Aged Out      No completion history exists for this topic.              Polio Vaccine (Inactivated Polio) (Series Information) Aged Out      No completion history exists for this topic.              Meningococcal Immunization (Series Information) Aged Out      No completion history exists for this topic.                    Patient Care Team:  Jitendra Mckay M.D. as PCP - General (Internal Medicine)  Ofelia Aguilera PT as Physical Therapist (Physical Therapy)        Social History     Tobacco Use    Smoking status: Every Day     Current packs/day: 0.50     Types: Pipe, Cigarettes    Smokeless tobacco: Never   Vaping Use    Vaping Use: Never used   Substance Use Topics    Alcohol use: Yes     Alcohol/week: 12.6 oz     Types: 21 Cans of beer per week    Drug use: No     Family History    Problem Relation Age of Onset    Non-contributory Mother     Non-contributory Father      He  has a past medical history of Hyperlipidemia, Thyroid disease, and Unspecified vitamin D deficiency.   Past Surgical History:   Procedure Laterality Date    OTHER ABDOMINAL SURGERY      Appencectomy       Exam:   /80 (BP Location: Right arm, Patient Position: Sitting, BP Cuff Size: Adult)   Pulse (!) 54   Temp 36.4 °C (97.5 °F) (Temporal)   SpO2 96%  There is no height or weight on file to calculate BMI.    Hearing good.    Dentition good  Alert, oriented in no acute distress.  Eye contact is good, speech goal directed, affect calm    Assessment and Plan. The following treatment and monitoring plan is recommended:    1. Medicare annual wellness visit, subsequent  All metrics reviewed and updated.    2. Need for vaccination     - Influenza Vaccine, High Dose (65+ Only)  - Hepatitis A Vaccine Adult 19+  - Zoster Vac Recomb Adjuvanted (SHINGRIX) 50 MCG/0.5ML Recon Susp; Inject 0.5 mL into the shoulder, thigh, or buttocks one time for 1 dose.  Dispense: 0.5 mL; Refill: 0    3. Mixed hyperlipidemia        Under good control continue current regimen  - rosuvastatin (CRESTOR) 40 MG tablet; Take 1 Tablet by mouth every day.  Dispense: 90 Tablet; Refill: 4  - Comp Metabolic Panel; Future  - CBC WITH DIFFERENTIAL; Future  - TSH; Future  - Lipid Profile; Future    4. Hypothyroidism due to acquired atrophy of thyroid  Good control continue current regimen  - levothyroxine (SYNTHROID) 100 MCG Tab; Take 1 Tablet by mouth every morning on an empty stomach.  Dispense: 90 Tablet; Refill: 4  - TSH; Future    5. Vitamin D deficiency disease  Good control continue current regimen  - Cholecalciferol (VITAMIN D-3) 125 MCG (5000 UT) Tab; Take 5,000 Units by mouth every day.  Dispense: 100 Tablet; Refill: 3  - VITAMIN D,25 HYDROXY (DEFICIENCY); Future    6. IFG (impaired fasting glucose)  Control continue Mediterranean diet and  exercise  - HEMOGLOBIN A1C; Future    7. BPH without urinary obstruction  Good control continue surveillance.  No medication.  - PROSTATE SPECIFIC AG DIAGNOSTIC; Future    8. Screening for AAA (abdominal aortic aneurysm)     - US-ABDOMINAL AORTA W/O DOPPLER; Future    9. Chronic shoulder bursitis, right  Chronic ongoing problem for the last 3 and half months.  Needs further evaluation by orthopedist.  Not responding to conservative measures with NSAIDs ice alternating with heat and rest.  Will defer to orthopedic surgery as to getting x-rays and or further imaging such as MRI to rule out rotator cuff tear.  Working diagnosis is chronic or recurrent bursitis, rotator cuff injury or tear, or development of significant osteoarthritis.  - Referral to Orthopedics      Services suggested: No services needed at this time  Health Care Screening: Age-appropriate preventive services recommended by USPTF and ACIP covered by Medicare were discussed today. Services ordered if indicated and agreed upon by the patient.  Referrals offered: Community-based lifestyle interventions to reduce health risks and promote self-management and wellness, fall prevention, nutrition, physical activity, tobacco-use cessation, weight loss, and mental health services as per orders if indicated.    Discussion today about general wellness and lifestyle habits:    Prevent falls and reduce trip hazards; Cautioned about securing or removing rugs.  Have a working fire alarm and carbon monoxide detector;   Engage in regular physical activity and social activities     Follow-up: No follow-ups on file.

## 2023-10-13 ENCOUNTER — HOSPITAL ENCOUNTER (OUTPATIENT)
Dept: RADIOLOGY | Facility: MEDICAL CENTER | Age: 68
End: 2023-10-13
Attending: INTERNAL MEDICINE
Payer: MEDICARE

## 2023-10-13 DIAGNOSIS — Z13.6 SCREENING FOR AAA (ABDOMINAL AORTIC ANEURYSM): ICD-10-CM

## 2023-10-13 PROCEDURE — 76775 US EXAM ABDO BACK WALL LIM: CPT

## 2023-11-06 ENCOUNTER — PHYSICAL THERAPY (OUTPATIENT)
Dept: PHYSICAL THERAPY | Facility: REHABILITATION | Age: 68
End: 2023-11-06
Attending: PHYSICIAN ASSISTANT
Payer: MEDICARE

## 2023-11-06 DIAGNOSIS — S46.011A STRAIN OF TENDON OF ROTATOR CUFF, RIGHT, INITIAL ENCOUNTER: ICD-10-CM

## 2023-11-06 PROCEDURE — 97161 PT EVAL LOW COMPLEX 20 MIN: CPT

## 2023-11-06 ASSESSMENT — ENCOUNTER SYMPTOMS
ALLEVIATING FACTORS: PAIN MEDICATION
EXACERBATED BY: OVERHEAD ACTIVITY
PAIN SCALE AT HIGHEST: 9
PAIN SCALE: 3
EXACERBATED BY: RAPID POSITION CHANGES
QUALITY: SHARP
QUALITY: ACHING
PAIN SCALE AT LOWEST: 1
ALLEVIATING FACTORS: REST
PAIN TIMING: INTERMITTENT

## 2023-11-06 NOTE — OP THERAPY EVALUATION
"  Outpatient Physical Therapy  INITIAL EVALUATION    [facility] [address]  [address]  [place] [address]  Phone:  [phone]  Fax:  [phone]    Date of Evaluation: 11/06/2023    Patient: Raymundo Vega  YOB: 1955  MRN: [id number]     Referring Provider: Deshawn Torrez P.A.-C.  [address] [place] [address]   Referring Diagnosis Strain of tendon of right rotator cuff, initial encounter [S46.011A]     Time Calculation  Start time: 0802  Stop time: 0848 Time Calculation (min): 46 minutes         Chief Complaint: Shoulder Problem    Visit Diagnoses     ICD-10-CM   1. Strain of tendon of rotator cuff, right, initial encounter  S46.011A       Date of onset of impairment: 6/11/2023    Subjective:   History of Present Illness:     Date of onset:  6/11/2023    Mechanism of injury:  Patient is a pleasant 68 year old male who presents with R shoulder pain. He reports he had a cortisone shot on 10/13/23 which helped for \"a few days.\" He reports his ROM is pretty good but he does not have pain free ROM. Patient reports he has had PT in the past for his back which he still does the exercises. He reports his goal is to eliminate some of his pain with ROM. He reports he is taking Aleve, as needed, but trying not too take too much.  Patient reports that his pain began on father's day when he was trimming trees. He reports that he does not believe he \"tore it or anything\". He reports he had left shoulder pain already which is why he was overusing his R shoulder.     Patient denies numbness and tingling. He reports his shoulder is \"real\" achy going around his shoulder joint. He reports rocking back and forth with driving is the worst as well as overhead reaching. He reports he can feel it \"click, click, click\" when reaching overhead.     Patient reports early on he took a few days off and the resting really helped. He reports that when he returned to work, the pain " "returned.         Prior level of function:  Independent  Headaches:  no headaches  Ear problems: hearing loss  Sleep disturbance:  Interrupted sleep  Pain:     Current pain rating:  3    At best pain ratin    At worst pain ratin    Location:  R shoulder/ L shoulder    Quality:  Aching and sharp    Pain timing:  Intermittent    Relieving factors:  Rest and pain medication    Aggravating factors:  Rapid position changes and overhead activity (ER, Rocking with driving)    Progression:  Improving  Social Support:     Lives in:  Multiple-level home    Lives with:  Spouse  Hand dominance:  Right  Diagnostic Tests:     Diagnostic Tests Comments:  R shoulder xray, 10/13/23:   \"Narrative  Grashey, axillary, AP, outlet of the right shoulder ordered today and   reviewed by myself show mild glenohumeral joint space narrowing.  Moderate   AC joint degenerative changes.  There is no acute fractures or bony   lesions.  There is a curved acromion. \"  Treatments:     Current treatment:  Physical therapy  Activities of Daily Living:     Patient reported ADL status: Reports some increase in pain with bathing but states he can \"push\" through it. Reports putting on coveralls is difficult.   Patient Goals:     Patient goals for therapy:  Decreased pain, increased strength and independence with ADLs/IADLs    Other patient goals:  Able to put on coveralls with decreased pain      Past Medical History:   Diagnosis Date    Hyperlipidemia     Thyroid disease     hypothyroid     Unspecified vitamin D deficiency      Past Surgical History:   Procedure Laterality Date    OTHER ABDOMINAL SURGERY      Appencectomy     Social History     Tobacco Use    Smoking status: Every Day     Current packs/day: 0.50     Types: Pipe, Cigarettes    Smokeless tobacco: Never   Substance Use Topics    Alcohol use: Yes     Alcohol/week: 12.6 oz     Types: 21 Cans of beer per week     Family and Occupational History     Socioeconomic History    Marital " "status: Single     Spouse name: Not on file    Number of children: Not on file    Years of education: Not on file    Highest education level: Not on file   Occupational History    Not on file       Objective     Observations     Additional Observation Details  Patient denies swelling to R shoulder; he reports it feels like there \"may be\" swelling inside    Denies spasms    Postural Observations  Seated posture: fair  Standing posture: fair    Additional Postural Observation Details  Rounded shoulders     Cervical Screen    Cervical range of motion within normal limits  Thoracic Screen    Thoracic range of motion within normal limits    Neurological Testing     Sensation     Shoulder   Left Shoulder   Intact: light touch    Right Shoulder   Intact: Light touch    Reflexes   Left   Nguyen's reflex: negative    Right   Nguyen's reflex: negative    Palpation     Right   No palpable tenderness to the anterior deltoid, cervical paraspinals, infraspinatus, lower trapezius, middle deltoid, middle trapezius, posterior deltoid, rhomboids, subclavius, teres major, teres minor, thoracic paraspinals, triceps and upper trapezius.   Tenderness of the biceps, pectoralis major, pectoralis minor, subscapularis and supraspinatus.     Tenderness     Right Shoulder  Tenderness in the biceps tendon (proximal), bicipital groove, subscapularis tendon and supraspinatus tendon. No tenderness in the AC joint, acromion, coracoid process, lateral scapula and medial scapula.     Active Range of Motion   Left Shoulder   Flexion: 172 degrees with pain  Abduction: 175 degrees with pain  External rotation 0°: 62 degrees   Internal rotation 0°: 90 degrees     Right Shoulder   Flexion: 170 degrees with pain  Abduction: 175 degrees with pain  External rotation 0°: 90 degrees   Internal rotation 0°: 63 degrees with pain    Additional Active Range of Motion Details  Reports \"popping\" sensation    Strength:      Left Shoulder   Planes of Motion "   Flexion: 4-   Abduction: 4   External rotation at 0°: 5   Internal rotation at 0°: 4+   Isolated Muscles   Biceps: 4+   Triceps: 5     Right Shoulder   Planes of Motion   Flexion: 4   Abduction: 4   External rotation at 0°: 5   Internal rotation at 0°: 4-   Isolated Muscles   Biceps: 4+   Triceps: 5     Tests     Left Shoulder   Positive Hawkin's.   Negative empty can and full can.     Right Shoulder   Positive Hawkin's.   Negative empty can and full can.         Therapeutic Exercises (CPT 49917):     1. PT evaluation completed. POC discussed, goals discussed. Patient in agreement with plan.    20. PN due 12/6/23      Time-based treatments/modalities:           Assessment, Response and Plan:   Impairments: activity intolerance, impaired functional mobility, impaired physical strength, lacks appropriate home exercise program and pain with function    Assessment details:  Patient is a pleasant 68 year old male who presents to PT evaluation with complaint of R shoulder pain but reports B shoulder pain at baseline. Patient demonstrates AROM, B, WFL however reports pain at end range. Mild strength deficits to B shoulders are also noted with difficulty reported during IR and overhead reaching mobility. Patient also reports difficulty donning his coveralls, which are required frequently for work. Patient demonstrates a positive King-Raz test, B, with negative full and empty can testing noted B. At this time, patient will benefit from skilled PT to promote improved R UE strength for decreased pain and improved quality of life.   Barriers to therapy:  Comorbidities and age  Prognosis: good    Goals:   Short Term Goals:   1. Patient will demonstrate independent HEP to promote increased strength for decreased pain and improved functional activity tolerance and mobility skills.     2. Patient will demonstrate improved B shoulder strength to grossly 5/5 to promote improved functional mobility skills and increased  quality of life.     Short term goal time span:  2-4 weeks      Long Term Goals:    1. Patient will demonstrate improved QuickDash score to indicate increased quality of life.    2. Patient will report ability to don overalls with decreased pain for improved functional mobility skills.    3. Patient will report decreased pain with AROM to B shoulders to allow for improved functional mobility skills and increased quality of life.   Long term goal time span:  6-8 weeks    Plan:   Therapy options:  Physical therapy treatment to continue  Planned therapy interventions:  Neuromuscular Re-education (CPT 93990), Manual Therapy (CPT 38483), Hot or Cold Pack Therapy (CPT 71625), E Stim Unattended (CPT 99901), Therapeutic Activities (CPT 00980) and Therapeutic Exercise (CPT 52841)  Frequency:  2x week  Duration in weeks:  8  Discussed with:  Patient      Functional Assessment Used  Quickdash General Total Score: 31.82     Referring provider co-signature:  I have reviewed this plan of care and my co-signature certifies the need for services.    Certification Period: 11/06/2023 to  01/01/24    Physician Signature: ________________________________ Date: ______________

## 2023-11-10 ENCOUNTER — PHYSICAL THERAPY (OUTPATIENT)
Dept: PHYSICAL THERAPY | Facility: REHABILITATION | Age: 68
End: 2023-11-10
Attending: PHYSICIAN ASSISTANT
Payer: MEDICARE

## 2023-11-10 DIAGNOSIS — S46.011A STRAIN OF TENDON OF ROTATOR CUFF, RIGHT, INITIAL ENCOUNTER: ICD-10-CM

## 2023-11-10 PROCEDURE — 97110 THERAPEUTIC EXERCISES: CPT

## 2023-11-10 NOTE — OP THERAPY DAILY TREATMENT
Outpatient Physical Therapy  DAILY TREATMENT     [facility] Physical [address].  [address]  [place] [address]  Phone:  [phone]  Fax:  [phone]    Date: 11/10/2023    Patient: Raymundo Vega  YOB: 1955  MRN: [id number]     Time Calculation    Start time: 0930  Stop time: 1015 Time Calculation (min): 45 minutes         Chief Complaint: Shoulder Problem    Visit #: 2    SUBJECTIVE:  Ice, KT tape  Patient presents to PT session and reports that he felt pretty good the day after evaluation. He reports his pain returned on the following day. Reports he has some pain radiating to his neck which he hasn't had in a couple of weeks. Patient asked about ice/heat and KT tape benefits. Discussed with patient.     OBJECTIVE:  Current objective measures:           Therapeutic Exercises (CPT 22467):     1. Pulleys, x5 minutes, pain free ROM    2. isometric flexion, x10, 5 second holds; B    3. isometric abduction, x10, 5 second holds, B    4. isometric IR, x10, 5 second holds, B    5. isometric ER, x10, 5 second holds, B    6. supine dowel kaleb shoulder flexion, x10, pain free ROM    7. supine dowel kaleb shoulder abduction, x10, pain free ROM    20. PN due 12/6/23      Therapeutic Exercise Summary: Access Code: [email or web address]  URL: [email or web address]  Date: 11/10/2023  Prepared by: Suma Tidwell    Exercises  - Isometric Shoulder Flexion at Wall  - 2 x daily - 1-2 sets - 10 reps - 5 seconds hold  - Isometric Shoulder Abduction at Wall  - 2 x daily - 1-2 sets - 10 reps - 5 seconds hold  - Standing Isometric Shoulder Internal Rotation at Doorway  - 2 x daily - 1-2 sets - 10 reps - 5 seconds hold  - Isometric Shoulder External Rotation at Wall  - 2 x daily - 1-2 sets - 10 reps - 5 seconds hold  - Supine Shoulder Flexion Extension AAROM with Dowel  - 2 x daily - 1-2 sets - 10 reps  - Supine Shoulder Abduction AAROM with Dowel  - 2 x daily - 1-2 sets - 10 reps      Time-based  treatments/modalities:    Physical Therapy Timed Treatment Charges  Therapeutic exercise minutes (CPT 80640): 45 minutes      Pain rating (1-10) before treatment:  3 R shoulder; 1 L shoulder  Pain rating (1-10) after treatment:  reports less pain and more fatigue    ASSESSMENT:   Response to treatment: Patient tolerates PT session well today. Initiated strengthening and ROM activities this date and updated HEP. Patient tolerates activities well but does report fatigue. At this time, patient will benefit from continued skilled PT to promote further strength and ROM for decreased     PLAN/RECOMMENDATIONS:   Plan for treatment: therapy treatment to continue next visit.  Planned interventions for next visit: continue with current treatment.

## 2023-11-13 ENCOUNTER — PHYSICAL THERAPY (OUTPATIENT)
Dept: PHYSICAL THERAPY | Facility: REHABILITATION | Age: 68
End: 2023-11-13
Attending: PHYSICIAN ASSISTANT
Payer: MEDICARE

## 2023-11-13 DIAGNOSIS — S46.011A STRAIN OF TENDON OF ROTATOR CUFF, RIGHT, INITIAL ENCOUNTER: ICD-10-CM

## 2023-11-13 PROCEDURE — 97110 THERAPEUTIC EXERCISES: CPT

## 2023-11-13 NOTE — OP THERAPY DAILY TREATMENT
Outpatient Physical Therapy  DAILY TREATMENT     [facility] Physical [address].  [address]  [place] [address]  Phone:  [phone]  Fax:  [phone]    Date: 11/13/2023    Patient: Raymundo Vega  YOB: 1955  MRN: [id number]     Time Calculation    Start time: 0758  Stop time: 0846 Time Calculation (min): 48 minutes         Chief Complaint: Shoulder Problem    Visit #: 3    SUBJECTIVE:  Patient presents to PT session and reports that he completed his HEP over the weekend. He reports improving pain. Discussed some potential neck involvement that patient may benefit from further assessment in upcoming visits, as indicated. Patient in agreement.     OBJECTIVE:  Current objective measures:           Therapeutic Exercises (CPT 42723):     1. Pulleys, x5 minutes, pain free ROM    2. isometric flexion, x10, 5 second holds; B    3. isometric abduction, x10, 5 second holds, B    4. isometric IR, x10, 5 second holds, B    5. isometric ER, x10, 5 second holds, B    6. supine dowel kalbe shoulder flexion, x10, nt    7. supine dowel kaleb shoulder abduction, x10, provided verbal and tactile cues for improved form    20. PN due 12/6/23      Therapeutic Exercise Summary: Access Code: [email or web address]  URL: [email or web address]  Date: 11/10/2023  Prepared by: Suma Tidwell    Exercises  - Isometric Shoulder Flexion at Wall  - 2 x daily - 1-2 sets - 10 reps - 5 seconds hold  - Isometric Shoulder Abduction at Wall  - 2 x daily - 1-2 sets - 10 reps - 5 seconds hold  - Standing Isometric Shoulder Internal Rotation at Doorway  - 2 x daily - 1-2 sets - 10 reps - 5 seconds hold  - Isometric Shoulder External Rotation at Wall  - 2 x daily - 1-2 sets - 10 reps - 5 seconds hold  - Supine Shoulder Flexion Extension AAROM with Dowel  - 2 x daily - 1-2 sets - 10 reps  - Supine Shoulder Abduction AAROM with Dowel  - 2 x daily - 1-2 sets - 10 reps      Time-based treatments/modalities:    Physical  Therapy Timed Treatment Charges  Therapeutic exercise minutes (CPT 07011): 48 minutes      Pain rating (1-10) before treatment:  2 R shoulder; 0, L shoulder  Pain rating (1-10) after treatment:  1, R shoulder; 0 L shoulder    ASSESSMENT:   Response to treatment: Patient tolerates PT treatment well today. Patient has been compliant with HEP and demonstrates improved form this date. Reporting overall good pain relief to B shoulders as well. With conversation, PT questions potential cervical involvement and discussed potential need for further assessment on upcoming visit, which patient is in agreement to as needed. At this time, patient will benefit from continued skilled PT to promote further decrease pain and improved quality of life.     PLAN/RECOMMENDATIONS:   Plan for treatment: therapy treatment to continue next visit.  Planned interventions for next visit: continue with current treatment.

## 2023-11-17 ENCOUNTER — PHYSICAL THERAPY (OUTPATIENT)
Dept: PHYSICAL THERAPY | Facility: REHABILITATION | Age: 68
End: 2023-11-17
Attending: PHYSICIAN ASSISTANT
Payer: MEDICARE

## 2023-11-17 DIAGNOSIS — S46.011A STRAIN OF TENDON OF ROTATOR CUFF, RIGHT, INITIAL ENCOUNTER: ICD-10-CM

## 2023-11-17 PROCEDURE — 97110 THERAPEUTIC EXERCISES: CPT

## 2023-11-17 NOTE — OP THERAPY DAILY TREATMENT
"  Outpatient Physical Therapy  DAILY TREATMENT     [facility] Physical [address].  [address]  [place] [address]  Phone:  [phone]  Fax:  [phone]    Date: 11/17/2023    Patient: Raymundo Vega  YOB: 1955  MRN: [id number]     Time Calculation    Start time: 0931  Stop time: 1015 Time Calculation (min): 44 minutes         Chief Complaint: Shoulder Problem    Visit #: 4    SUBJECTIVE:  Patient reports that he has been doing his HEP 2x/day and his shoulders are feeling pretty good. He reports that his L shoulder pain is 0 and his R shoulder pain is a \"half.\" He reports that he did irritate his back a little yesterday but it feels a little better today and he does not know if he did it at work or from poor form with PT.     OBJECTIVE:  Current objective measures:           Therapeutic Exercises (CPT 34324):     1. Pulleys, x5 minutes, pain free ROM    2. isometric flexion, x10, 5 second holds; B    3. isometric abduction, x10, 5 second holds, B    4. isometric IR, x10, 5 second holds, B, discussed holding for HEP as patient reports feeling it in his back    5. isometric ER, x10, 5 second holds, B    6. supine shoulder flexion light yellow watebar, 2x10, improved from previous session    7. supine shoulder abduction light yellow watebar, 2x10, improving; mild pain at end range reported; verbal cues and demonstration for improved AAROM form; increased time for task    18. initiate further strengthening upon upcoming visits    20. PN due 12/6/23      Therapeutic Exercise Summary: Access Code: [email or web address]  URL: [email or web address]  Date: 11/10/2023  Prepared by: Suma Tidwell    Exercises  - Isometric Shoulder Flexion at Wall  - 2 x daily - 1-2 sets - 10 reps - 5 seconds hold  - Isometric Shoulder Abduction at Wall  - 2 x daily - 1-2 sets - 10 reps - 5 seconds hold  - Standing Isometric Shoulder Internal Rotation at Doorway  - 2 x daily - 1-2 sets - 10 reps - 5 " seconds hold  - Isometric Shoulder External Rotation at Wall  - 2 x daily - 1-2 sets - 10 reps - 5 seconds hold  - Supine Shoulder Flexion Extension AAROM with Dowel  - 2 x daily - 1-2 sets - 10 reps  - Supine Shoulder Abduction AAROM with Dowel  - 2 x daily - 1-2 sets - 10 reps      Time-based treatments/modalities:    Physical Therapy Timed Treatment Charges  Therapeutic exercise minutes (CPT 95757): 44 minutes      Pain rating (1-10) before treatment: a half, R shoulder; 0 L shoulder  Pain rating (1-10) after treatment:  No change in pain after session; reports slightly more fatigued     ASSESSMENT:   Response to treatment: Patient tolerates PT treatment well today. Patient reports improving shoulder pain overall with exercises, reporting his L shoulder is back to baseline and his R shoulder has had good improvement in pain. Reports abduction remains mildly painful but near end range of motion with AAROM. At this time, patient will benefit from continued skilled PT to promote further decreased pain and improved functional mobility skills and quality of life.     PLAN/RECOMMENDATIONS:   Plan for treatment: therapy treatment to continue next visit.  Planned interventions for next visit: continue with current treatment.

## 2023-11-20 ENCOUNTER — PHYSICAL THERAPY (OUTPATIENT)
Dept: PHYSICAL THERAPY | Facility: REHABILITATION | Age: 68
End: 2023-11-20
Attending: PHYSICIAN ASSISTANT
Payer: MEDICARE

## 2023-11-20 DIAGNOSIS — S46.011A STRAIN OF TENDON OF ROTATOR CUFF, RIGHT, INITIAL ENCOUNTER: ICD-10-CM

## 2023-11-20 PROCEDURE — 97110 THERAPEUTIC EXERCISES: CPT

## 2023-11-20 NOTE — OP THERAPY DAILY TREATMENT
Outpatient Physical Therapy  DAILY TREATMENT     [facility] Physical [address].  [address]  [place] [address]  Phone:  [phone]  Fax:  [phone]    Date: 11/20/2023    Patient: Raymundo Vega  YOB: 1955  MRN: [id number]     Time Calculation    Start time: 1015  Stop time: 1103 Time Calculation (min): 48 minutes         Chief Complaint: Shoulder Problem    Visit #: 5    SUBJECTIVE:  Patient reports that his R shoulder pain is almost the same as his L. Patient states that he feels like he is nearing baseline regarding shoulder pain. Discussed updating HEP to promote further strengthening as tolerated.     OBJECTIVE:  Current objective measures:           Therapeutic Exercises (CPT 72633):     1. Pulleys, x5 minutes, nt    2. isometric flexion, x10, 5 second holds; B, nt    3. isometric abduction, x10, 5 second holds, B, nt    4. isometric IR, x10, 5 second holds, B, nt    5. isometric ER, x10, 5 second holds, B, nt    6. supine shoulder flexion yellow watebar, 2x10, improved from previous session    7. supine shoulder abduction yellow watebar, 2x10, improved from previous session    10. shoulder flexion, 1# weight; standing; B x10, educated to utilize soup can at home    11. shoulder abduction, 1# weight, standing; B x10, educated to utilize soup can at home    12. Shoulder ER, green theraband, x10; B    13. shoulder flashers, green theraband x10    18. initiate further strengthening upon upcoming visits    20. PN due 12/6/23      Therapeutic Exercise Summary: Access Code: [email or web address]  URL: [email or web address]  Date: 11/10/2023  Prepared by: Suma Tidwell    Exercises  - Isometric Shoulder Flexion at Wall  - 2 x daily - 1-2 sets - 10 reps - 5 seconds hold  - Isometric Shoulder Abduction at Wall  - 2 x daily - 1-2 sets - 10 reps - 5 seconds hold  - Standing Isometric Shoulder Internal Rotation at Doorway  - 2 x daily - 1-2 sets - 10 reps - 5 seconds hold  -  Isometric Shoulder External Rotation at Wall  - 2 x daily - 1-2 sets - 10 reps - 5 seconds hold  - Supine Shoulder Flexion Extension AAROM with Dowel  - 2 x daily - 1-2 sets - 10 reps  - Supine Shoulder Abduction AAROM with Dowel  - 2 x daily - 1-2 sets - 10 reps    Access Code: [email or web address]  URL: [email or web address]  Date: 11/20/2023  Prepared by: Suma Buening    Exercises  - Single Arm Shoulder Flexion with Dumbbell  - 1-2 x daily - 1-2 sets - 10 reps  - Standing Single Arm Shoulder Abduction with Dumbbell - Thumb Up  - 1-2 x daily - 1-2 sets - 10 reps  - Shoulder External Rotation with Anchored Resistance  - 1-2 x daily - 1-2 sets - 10 reps  - Shoulder External Rotation with Resistance  - 1-2 x daily - 1-2 sets - 10 reps      Time-based treatments/modalities:    Physical Therapy Timed Treatment Charges  Therapeutic exercise minutes (CPT 87092): 48 minutes      Pain rating (1-10) before treatment:  1; R shoulder  Pain rating (1-10) after treatment:  less than 1, R shoulder/back    ASSESSMENT:   Response to treatment: Patient tolerates PT treatment well today. Patient is demonstrating good progression with B shoulder pain and improving functional mobility skills. Updated HEP today to promote continued strength and ROM for increased quality of life.      PLAN/RECOMMENDATIONS:   Plan for treatment: therapy treatment to continue next visit.  Planned interventions for next visit: continue with current treatment.

## 2023-11-27 ENCOUNTER — PHYSICAL THERAPY (OUTPATIENT)
Dept: PHYSICAL THERAPY | Facility: REHABILITATION | Age: 68
End: 2023-11-27
Attending: PHYSICIAN ASSISTANT
Payer: MEDICARE

## 2023-11-27 DIAGNOSIS — S46.011A STRAIN OF TENDON OF ROTATOR CUFF, RIGHT, INITIAL ENCOUNTER: ICD-10-CM

## 2023-11-27 PROCEDURE — 97110 THERAPEUTIC EXERCISES: CPT

## 2023-11-27 NOTE — OP THERAPY DAILY TREATMENT
"  Outpatient Physical Therapy  DAILY TREATMENT     [facility] Physical [address]  [address]  [place] [address]  Phone:  [phone]  Fax:  [phone]    Date: 11/27/2023    Patient: Raymundo Vega  YOB: 1955  MRN: [id number]     Time Calculation    Start time: 0846  Stop time: 0930 Time Calculation (min): 44 minutes         Chief Complaint: Shoulder Problem    Visit #: 6    SUBJECTIVE:  Patient reports that he thinks he over did his exercises on Friday but took a break and went back to the isometrics prior to returning to his \"new\" exercises. He reports that he woke up this morning with pain of 4/10 in the R shoulder and stiffness but it is now down to a 2/10. He reports that he did quit taking his Aleve on Friday as well.     OBJECTIVE:  Current objective measures: Patient education regarding shoulder mechanics and HEP.           Therapeutic Exercises (CPT 79994):     1. Pulleys, x5 minutes, nt    2. isometric flexion, x10, 5 second holds; B, nt    3. isometric abduction, x10, 5 second holds, B, nt    4. isometric IR, x10, 5 second holds, B, nt    5. isometric ER, x10, 5 second holds, B, nt    6. supine shoulder flexion yellow watebar, 2x10, nt    7. supine shoulder abduction yellow watebar, 2x10, nt    10. shoulder flexion, seated, x15, pink theraband, seated, x15 standing, pink theraband; B, compensates with fatigue    11. shoulder abduction, x15, pink theraband, seated, x15 standing, pink theraband; B    13. shoulder flashers, x15, green theraband    14. UBE, nt    18. initiate further strengthening upon upcoming visits    20. PN due 12/6/23      Therapeutic Exercise Summary: Access Code: [email or web address]  URL: [email or web address]  Date: 11/10/2023  Prepared by: Suma Tidwell    Exercises  - Isometric Shoulder Flexion at Wall  - 2 x daily - 1-2 sets - 10 reps - 5 seconds hold  - Isometric Shoulder Abduction at Wall  - 2 x daily - 1-2 sets - 10 reps - 5 seconds " "hold  - Standing Isometric Shoulder Internal Rotation at Doorway  - 2 x daily - 1-2 sets - 10 reps - 5 seconds hold  - Isometric Shoulder External Rotation at Wall  - 2 x daily - 1-2 sets - 10 reps - 5 seconds hold  - Supine Shoulder Flexion Extension AAROM with Dowel  - 2 x daily - 1-2 sets - 10 reps  - Supine Shoulder Abduction AAROM with Dowel  - 2 x daily - 1-2 sets - 10 reps    Access Code: [email or web address]  URL: [email or web address]  Date: 11/20/2023  Prepared by: Suma Tidwell    Exercises  - Single Arm Shoulder Flexion with Dumbbell  - 1-2 x daily - 1-2 sets - 10 reps  - Standing Single Arm Shoulder Abduction with Dumbbell - Thumb Up  - 1-2 x daily - 1-2 sets - 10 reps  - Shoulder External Rotation with Anchored Resistance  - 1-2 x daily - 1-2 sets - 10 reps  - Shoulder External Rotation with Resistance  - 1-2 x daily - 1-2 sets - 10 reps      Time-based treatments/modalities:    Physical Therapy Timed Treatment Charges  Therapeutic exercise minutes (CPT 79857): 44 minutes      Pain rating (1-10) before treatment:  2; R shoulder   Pain rating (1-10) after treatment:  1; R shoulder    ASSESSMENT:   Response to treatment: Patient tolerates PT treatment well today. Patient reports that he feels he may have \"over done it\" last week on Friday but pain remains tolerable. Adjusted HEP to encouraged patient to complete 1 set, 2x/day to promote continued strengthening with less risk of over work. At this time, patient will benefit from continued skilled PT for further decrease in pain and improved quality of life.     PLAN/RECOMMENDATIONS:   Plan for treatment: therapy treatment to continue next visit.  Planned interventions for next visit: continue with current treatment.         "

## 2023-11-30 ENCOUNTER — PHYSICAL THERAPY (OUTPATIENT)
Dept: PHYSICAL THERAPY | Facility: REHABILITATION | Age: 68
End: 2023-11-30
Attending: PHYSICIAN ASSISTANT
Payer: MEDICARE

## 2023-11-30 DIAGNOSIS — S46.011A STRAIN OF TENDON OF ROTATOR CUFF, RIGHT, INITIAL ENCOUNTER: ICD-10-CM

## 2023-11-30 PROCEDURE — 97110 THERAPEUTIC EXERCISES: CPT

## 2023-11-30 NOTE — OP THERAPY DAILY TREATMENT
"  Outpatient Physical Therapy  DAILY TREATMENT     [facility] Physical [address].  [address]  [place] [address]  Phone:  [phone]  Fax:  [phone]    Date: 11/30/2023    Patient: Raymundo Vega  YOB: 1955  MRN: [id number]     Time Calculation    Start time: 0931  Stop time: 1010 Time Calculation (min): 39 minutes         Chief Complaint: Shoulder Problem    Visit #: 7    SUBJECTIVE:  Patients reports that he likes the theraband exercises. He reports that he has been feeling better this week. He states that he was able to wash his hair with no pain on either shoulder. He reports his pain has \"a half\" to B shoulders at rest but if he is busy, he hasn't been noticing it as much.  Patient reports he did begin taking Aleve again but every other day versus every day as he previously was taking it.     OBJECTIVE:  Current objective measures:           Therapeutic Exercises (CPT 99237):     1. Pulleys, x5 minutes    2. isometric flexion, x10, 5 second holds; B, nt    3. isometric abduction, x10, 5 second holds, B, nt    4. isometric IR, x10, 5 second holds, B, nt    5. isometric ER, x10, 5 second holds, B, nt    6. supine shoulder flexion yellow watebar, x20, B    7. supine shoulder abduction yellow watebar, x20; B    10. shoulder flexion, seated, 2x 15 pink theraband, standing against wall    11. shoulder abduction, 2x 15 pink theraband, standing against wall    13. shoulder flashers, x15, green theraband    14. UBE, nt    18. initiate further strengthening upon upcoming visits    20. PN due 12/6/23      Therapeutic Exercise Summary: Access Code: [email or web address]  URL: [email or web address]  Date: 11/10/2023  Prepared by: Suma Tidwell    Exercises  - Isometric Shoulder Flexion at Wall  - 2 x daily - 1-2 sets - 10 reps - 5 seconds hold  - Isometric Shoulder Abduction at Wall  - 2 x daily - 1-2 sets - 10 reps - 5 seconds hold  - Standing Isometric Shoulder Internal " Rotation at Doorway  - 2 x daily - 1-2 sets - 10 reps - 5 seconds hold  - Isometric Shoulder External Rotation at Wall  - 2 x daily - 1-2 sets - 10 reps - 5 seconds hold  - Supine Shoulder Flexion Extension AAROM with Dowel  - 2 x daily - 1-2 sets - 10 reps  - Supine Shoulder Abduction AAROM with Dowel  - 2 x daily - 1-2 sets - 10 reps    Access Code: [email or web address]  URL: [email or web address]  Date: 11/20/2023  Prepared by: Suam Tidwell    Exercises  - Single Arm Shoulder Flexion with Dumbbell  - 1-2 x daily - 1-2 sets - 10 reps  - Standing Single Arm Shoulder Abduction with Dumbbell - Thumb Up  - 1-2 x daily - 1-2 sets - 10 reps  - Shoulder External Rotation with Anchored Resistance  - 1-2 x daily - 1-2 sets - 10 reps  - Shoulder External Rotation with Resistance  - 1-2 x daily - 1-2 sets - 10 reps      Time-based treatments/modalities:    Physical Therapy Timed Treatment Charges  Therapeutic exercise minutes (CPT 89942): 39 minutes      Pain rating (1-10) before treatment:  0.5, B shoulders; reports improving pain overall   Pain rating (1-10) after treatment:  0.5 R shoulder, fatigue; 0 L shoulder     ASSESSMENT:   Response to treatment: Patient tolerates PT treatment well today. Reports further improvement in pain and strength, including during ADLs. Discussed continuing with HEP at this time, without increase at this time as patient does continue to fatigue with exercises. Patient will benefit from further skilled PT to promote increased strength, ROM, and further improved quality of life.     PLAN/RECOMMENDATIONS:   Plan for treatment: therapy treatment to continue next visit.  Planned interventions for next visit: continue with current treatment.

## 2023-12-04 ENCOUNTER — PHYSICAL THERAPY (OUTPATIENT)
Dept: PHYSICAL THERAPY | Facility: REHABILITATION | Age: 68
End: 2023-12-04
Attending: PHYSICIAN ASSISTANT
Payer: MEDICARE

## 2023-12-04 DIAGNOSIS — S46.011A STRAIN OF TENDON OF ROTATOR CUFF, RIGHT, INITIAL ENCOUNTER: ICD-10-CM

## 2023-12-04 PROCEDURE — 97110 THERAPEUTIC EXERCISES: CPT

## 2023-12-04 NOTE — OP THERAPY DAILY TREATMENT
Outpatient Physical Therapy  DAILY TREATMENT     [facility] Physical [address].  [address]  [place] [address]  Phone:  [phone]  Fax:  [phone]    Date: 12/04/2023    Patient: Raymundo Vega  YOB: 1955  MRN: [id number]     Time Calculation    Start time: 1015  Stop time: 1102 Time Calculation (min): 47 minutes         Chief Complaint: Shoulder Problem    Visit #: 8    SUBJECTIVE:  Patient reports that last session, doubled number of repetitions. He had increase in fatigue/challenge but no set back or flair of pain with this challenge. He has been performing against wall to decrease compensation at back. He feels that ROM is back to baseline without crepitus or limitations and now only experiences pain with certain motions. Anticipating DC next session and needs to figure out how to condense his HEP. He is only taking Alieve every other day to manage inflammation.     OBJECTIVE:  Current objective measures:             Therapeutic Exercises (CPT 45414):     1. Pulleys, x5 minutes, NT    2. isometric flexion, x10, 5 second holds; B, nt    3. isometric abduction, x10, 5 second holds, B, nt    4. isometric IR, x10, 5 second holds, B, nt    5. isometric ER, x10, 5 second holds, B, nt    6. supine shoulder flexion yellow watebar, x20, B, NT    7. supine shoulder abduction yellow watebar, x20; B, NT    10. shoulder flexion, standing with back against wall, 2x 20 pink theraband    11. shoulder abduction (snow jaydon), standing with back against wall, 2x 20 pink theraband    13. shoulder flashers, x15, green theraband, NT    14. 90/90 shoulder ER, standing with back against wall, 2 x 10, pink theraband, Added to HEP    15. D2 shoulder flexion (drawing the sword), 2 x 10, pink theraband, Added to HEP    18. initiate further strengthening upon upcoming visits    20. PN due 12/6/23      Therapeutic Exercise Summary: Access Code: [email or web address]  URL:  [email or web address]  Date: 11/10/2023  Prepared by: Suma Buening    Exercises  - Isometric Shoulder Flexion at Wall  - 2 x daily - 1-2 sets - 10 reps - 5 seconds hold  - Isometric Shoulder Abduction at Wall  - 2 x daily - 1-2 sets - 10 reps - 5 seconds hold  - Standing Isometric Shoulder Internal Rotation at Doorway  - 2 x daily - 1-2 sets - 10 reps - 5 seconds hold  - Isometric Shoulder External Rotation at Wall  - 2 x daily - 1-2 sets - 10 reps - 5 seconds hold  - Supine Shoulder Flexion Extension AAROM with Dowel  - 2 x daily - 1-2 sets - 10 reps  - Supine Shoulder Abduction AAROM with Dowel  - 2 x daily - 1-2 sets - 10 reps    Access Code: [email or web address]  URL: [email or web address]  Date: 11/20/2023  Prepared by: Suma Buening    Exercises  - Single Arm Shoulder Flexion with Dumbbell  - 1-2 x daily - 1-2 sets - 10 reps  - Standing Single Arm Shoulder Abduction with Dumbbell - Thumb Up  - 1-2 x daily - 1-2 sets - 10 reps  - Shoulder External Rotation with Anchored Resistance  - 1-2 x daily - 1-2 sets - 10 reps  - Shoulder External Rotation with Resistance  - 1-2 x daily - 1-2 sets - 10 reps      Time-based treatments/modalities:    Physical Therapy Timed Treatment Charges  Therapeutic exercise minutes (CPT 35067): 47 minutes      ASSESSMENT:   Response to treatment: Updated HEP to include overhead and functional rotation movements. Will complete re-assessment testing next session and determine POC.     PLAN/RECOMMENDATIONS:   Plan for treatment: therapy treatment to continue next visit.  Planned interventions for next visit: continue with current treatment.

## 2023-12-08 ENCOUNTER — PHYSICAL THERAPY (OUTPATIENT)
Dept: PHYSICAL THERAPY | Facility: REHABILITATION | Age: 68
End: 2023-12-08
Attending: PHYSICIAN ASSISTANT
Payer: MEDICARE

## 2023-12-08 DIAGNOSIS — S46.011A STRAIN OF TENDON OF ROTATOR CUFF, RIGHT, INITIAL ENCOUNTER: ICD-10-CM

## 2023-12-08 PROCEDURE — 97110 THERAPEUTIC EXERCISES: CPT

## 2023-12-08 NOTE — OP THERAPY DISCHARGE SUMMARY
Outpatient Physical Therapy  DISCHARGE SUMMARY NOTE      [address]  [address]  [place] [address]  Phone:  [phone]  Fax:  [phone]    Date of Visit: 12/08/2023    Patient: Raymundo Vega  YOB: 1955  MRN: [id number]     Referring Provider: Deshawn Torrez P.A.-C.  [address] [address]   Referring Diagnosis Strain of muscle(s) and tendon(s) of the rotator cuff of right shoulder, initial encounter [S46.011A]         Functional Assessment Used  Quickdash General Total Score: 13.64     Your patient is being discharged from Physical Therapy with the following comments:   Goals met    Comments:    Goals:   Short Term Goals:   1. Patient will demonstrate independent HEP to promote increased strength for decreased pain and improved functional activity tolerance and mobility skills. -MET     2. Patient will demonstrate improved B shoulder strength to grossly 5/5 to promote improved functional mobility skills and increased quality of life.  -MET     Short term goal time span:  2-4 weeks      Long Term Goals:    1. Patient will demonstrate improved QuickDash score to indicate increased quality of life.-MET     2. Patient will report ability to don overalls with decreased pain for improved functional mobility skills.-MET     3. Patient will report decreased pain with AROM to B shoulders to allow for improved functional mobility skills and increased quality of life. -MET    Long term goal time span:  6-8 weeks    ASSESSMENT:   Response to treatment: Patient tolerates PT treatments well overall. Patient presents to PT session and reports his pain has improved as well as his functional mobility skill with good strength and ROM improvement noted. Patient has followed HEP well and has MET all goals at this time, and reports feeling ready for D/C from skilled PT. Patient encouraged to continue HEP and educated to retrieve new referral if  symptoms return or worsen. Patient verbalizes understanding.      Limitations Remaining:  No significant limitations reported at this time.     Recommendations:  Continue HEP. Follow up with MD as needed. Retrieve new skilled PT referral if symptoms return or new needs arise.     Thank you for allowing me to participate in this patient's care.       Suma Tidwell, PT    Date: 12/8/2023

## 2023-12-08 NOTE — OP THERAPY DAILY TREATMENT
"  Outpatient Physical Therapy  DAILY TREATMENT     [facility] [address]  [address]  [place] [address]  Phone:  [phone]  Fax:  [phone]    Date: 12/08/2023    Patient: Raymundo Vega  YOB: 1955  MRN: [id number]     Time Calculation    Start time: 1014  Stop time: 1101 Time Calculation (min): 47 minutes         Chief Complaint: Shoulder Problem    Visit #: 9    SUBJECTIVE:  Patient reports that he is doing well and is continuing with his HEP. He reports his pain continues to improve. He reports his only discomfort at the moment feels like soreness from \"working out.\" He reports that he worked on Wednesday and Thursday as well without issues. Patient reports that he feels ready to graduate from PT at this time.     OBJECTIVE:  Current objective measures:     Reviewed HEP. Answered all questions. Patient reports no further questions at this time regarding HEP.     Strength:       Left Shoulder   Planes of Motion   Flexion: 5  Abduction: 5   External rotation at 0°: 5   Internal rotation at 0°:5   Isolated Muscles   Biceps: 5  Triceps: 5      Right Shoulder   Planes of Motion   Flexion: 5   Abduction: 5   External rotation at 0°: 5   Internal rotation at 0°: 5   Isolated Muscles   Biceps: 5  Triceps: 5   Quickdash General Total Score: 13.64       Therapeutic Exercises (CPT 59343):     1. Pulleys, x5 minutes, NT    2. isometric flexion, x10, 5 second holds; B, nt    3. isometric abduction, x10, 5 second holds, B, nt    4. isometric IR, x10, 5 second holds, B, nt    5. isometric ER, x10, 5 second holds, B, nt    6. supine shoulder flexion yellow watebar, x20, B, NT    7. supine shoulder abduction yellow watebar, x20; B, NT    10. shoulder flexion, standing with back against wall, 2x 20 pink theraband, nt    11. shoulder abduction (snow jaydon), standing with back against wall, 2x 20 pink theraband, nt    13. shoulder flashers, x15, green theraband, NT    14. " "90/90 shoulder ER, standing with back against wall, 2 x 10, pink theraband, patient reports mild low back discomfort, provided modification to activity with patient reporting improved symptoms and ease with activity    15. D2 shoulder flexion (drawing the sword), 2 x 10, pink theraband      Therapeutic Exercise Summary: Access Code: [email or web address]  URL: [email or web address]  Date: 11/10/2023  Prepared by: Suma Buening    Exercises  - Isometric Shoulder Flexion at Wall  - 2 x daily - 1-2 sets - 10 reps - 5 seconds hold  - Isometric Shoulder Abduction at Wall  - 2 x daily - 1-2 sets - 10 reps - 5 seconds hold  - Standing Isometric Shoulder Internal Rotation at Doorway  - 2 x daily - 1-2 sets - 10 reps - 5 seconds hold  - Isometric Shoulder External Rotation at Wall  - 2 x daily - 1-2 sets - 10 reps - 5 seconds hold  - Supine Shoulder Flexion Extension AAROM with Dowel  - 2 x daily - 1-2 sets - 10 reps  - Supine Shoulder Abduction AAROM with Dowel  - 2 x daily - 1-2 sets - 10 reps    Access Code: [email or web address]  URL: [email or web address]  Date: 11/20/2023  Prepared by: Suma Buening    Exercises  - Single Arm Shoulder Flexion with Dumbbell  - 1-2 x daily - 1-2 sets - 10 reps  - Standing Single Arm Shoulder Abduction with Dumbbell - Thumb Up  - 1-2 x daily - 1-2 sets - 10 reps  - Shoulder External Rotation with Anchored Resistance  - 1-2 x daily - 1-2 sets - 10 reps  - Shoulder External Rotation with Resistance  - 1-2 x daily - 1-2 sets - 10 reps      Time-based treatments/modalities:    Physical Therapy Timed Treatment Charges  Therapeutic exercise minutes (CPT 22301): 47 minutes      Pain rating (1-10) before treatment:  \"less than 1\" and reports it's soreness/fatigue rather than shoulder pain   Pain rating after treatment: no complaint on exit     Goals:   Short Term Goals:   1. Patient will demonstrate independent HEP to promote increased strength for decreased pain and improved functional activity " tolerance and mobility skills. -MET     2. Patient will demonstrate improved B shoulder strength to grossly 5/5 to promote improved functional mobility skills and increased quality of life.  -MET     Short term goal time span:  2-4 weeks      Long Term Goals:    1. Patient will demonstrate improved QuickDash score to indicate increased quality of life.-MET     2. Patient will report ability to don overalls with decreased pain for improved functional mobility skills.-MET     3. Patient will report decreased pain with AROM to B shoulders to allow for improved functional mobility skills and increased quality of life. -MET    Long term goal time span:  6-8 weeks    ASSESSMENT:   Response to treatment: Patient tolerates PT treatments well overall. Patient presents to PT session and reports his pain has improved as well as his functional mobility skill with good strength and ROM improvement noted. Patient has followed HEP well and has MET all goals at this time, and reports feeling ready for D/C from skilled PT. Patient encouraged to continue HEP and educated to retrieve new referral if symptoms return or worsen. Patient verbalizes understanding.     PLAN/RECOMMENDATIONS:   Plan for treatment: no further treatment needed.  Planned interventions for next visit:  D/C skilled PT at this time. Patient reports that his pain is now controlled and he feels like he is back to baseline, if not a little better .

## 2024-04-23 ENCOUNTER — APPOINTMENT (OUTPATIENT)
Dept: MEDICAL GROUP | Age: 69
End: 2024-04-23
Payer: MEDICARE

## 2024-05-31 ENCOUNTER — OFFICE VISIT (OUTPATIENT)
Dept: URGENT CARE | Facility: PHYSICIAN GROUP | Age: 69
End: 2024-05-31
Payer: MEDICARE

## 2024-05-31 VITALS
SYSTOLIC BLOOD PRESSURE: 120 MMHG | DIASTOLIC BLOOD PRESSURE: 80 MMHG | BODY MASS INDEX: 22.43 KG/M2 | TEMPERATURE: 98.3 F | RESPIRATION RATE: 16 BRPM | OXYGEN SATURATION: 94 % | HEART RATE: 52 BPM | HEIGHT: 69 IN | WEIGHT: 151.46 LBS

## 2024-05-31 DIAGNOSIS — J06.9 VIRAL URI WITH COUGH: ICD-10-CM

## 2024-05-31 RX ORDER — DEXTROMETHORPHAN HYDROBROMIDE AND PROMETHAZINE HYDROCHLORIDE 15; 6.25 MG/5ML; MG/5ML
5 SYRUP ORAL EVERY 6 HOURS PRN
Qty: 118 ML | Refills: 0 | Status: SHIPPED | OUTPATIENT
Start: 2024-05-31 | End: 2024-06-07

## 2024-05-31 RX ORDER — METHYLPREDNISOLONE 4 MG/1
TABLET ORAL
Qty: 21 TABLET | Refills: 0 | Status: SHIPPED | OUTPATIENT
Start: 2024-05-31

## 2024-05-31 ASSESSMENT — ENCOUNTER SYMPTOMS
FEVER: 0
WHEEZING: 1
COUGH: 1

## 2024-05-31 NOTE — PROGRESS NOTES
Subjective:   Hans Vega is a 69 y.o. male who presents for Cough (Cough, congestion, SOB, cold symptoms X 1 week )      HPI: This is a 70 yo male who presents today for cough and congestion. This is a new problem. The patient reports that his symptoms developed 1 week ago. He reports chest congestion, coughing fits, chest tightness, and wheezing. He has taking robitussin without improvement. He is a current daily smoker. He denies any lung conditions. He denies fevers.     Review of Systems   Constitutional:  Negative for fever.   HENT:  Positive for congestion.    Respiratory:  Positive for cough and wheezing.         +chest tightness       Medications:    Current Outpatient Medications on File Prior to Visit   Medication Sig Dispense Refill    rosuvastatin (CRESTOR) 40 MG tablet Take 1 Tablet by mouth every day. 90 Tablet 4    levothyroxine (SYNTHROID) 100 MCG Tab Take 1 Tablet by mouth every morning on an empty stomach. 90 Tablet 4    Cholecalciferol (VITAMIN D-3) 125 MCG (5000 UT) Tab Take 5,000 Units by mouth every day. 100 Tablet 3    Glucosamine HCl-MSM (MSM GLUCOSAMINE PO) Take  by mouth.      aspirin EC (ECOTRIN) 81 MG Tablet Delayed Response Take 81 mg by mouth every day.       No current facility-administered medications on file prior to visit.        Allergies:   Zetia [ezetimibe]    Problem List:   Patient Active Problem List   Diagnosis    Mixed hyperlipidemia    Hypothyroidism due to acquired atrophy of thyroid    Tobacco dependence    Vitamin D deficiency disease    Neoplasm of uncertain behavior of connective and other soft tissue        Surgical History:  Past Surgical History:   Procedure Laterality Date    OTHER ABDOMINAL SURGERY      Appencectomy       Past Social Hx:   Social History     Tobacco Use    Smoking status: Every Day     Current packs/day: 0.50     Types: Pipe, Cigarettes    Smokeless tobacco: Never   Vaping Use    Vaping status: Never Used   Substance Use Topics     "Alcohol use: Yes     Alcohol/week: 12.6 oz     Types: 21 Cans of beer per week    Drug use: No          Problem list, medications, and allergies reviewed by myself today in Epic.     Objective:     /80 (BP Location: Right arm, Patient Position: Sitting, BP Cuff Size: Adult)   Pulse (!) 52   Temp 36.8 °C (98.3 °F) (Temporal)   Resp 16   Ht 1.753 m (5' 9\")   Wt 68.7 kg (151 lb 7.3 oz)   SpO2 94%   BMI 22.37 kg/m²     Physical Exam  Vitals and nursing note reviewed.   Constitutional:       General: He is not in acute distress.     Appearance: Normal appearance. He is normal weight. He is not ill-appearing, toxic-appearing or diaphoretic.   HENT:      Head: Normocephalic and atraumatic.      Right Ear: Tympanic membrane, ear canal and external ear normal. There is no impacted cerumen.      Left Ear: Tympanic membrane, ear canal and external ear normal. There is no impacted cerumen.      Nose: Congestion present. No rhinorrhea.      Mouth/Throat:      Mouth: Mucous membranes are moist.      Pharynx: Oropharynx is clear. No oropharyngeal exudate or posterior oropharyngeal erythema.   Cardiovascular:      Rate and Rhythm: Normal rate and regular rhythm.      Pulses: Normal pulses.      Heart sounds: Normal heart sounds. No murmur heard.     No friction rub.   Pulmonary:      Effort: Pulmonary effort is normal. No respiratory distress.      Breath sounds: Normal breath sounds. No stridor. No wheezing, rhonchi or rales.   Chest:      Chest wall: No tenderness.   Musculoskeletal:      Cervical back: Normal range of motion and neck supple. No tenderness.   Lymphadenopathy:      Cervical: No cervical adenopathy.   Skin:     General: Skin is warm and dry.      Capillary Refill: Capillary refill takes less than 2 seconds.   Neurological:      General: No focal deficit present.      Mental Status: He is alert and oriented to person, place, and time. Mental status is at baseline.   Psychiatric:         Mood and Affect: " Mood normal.         Behavior: Behavior normal.         Thought Content: Thought content normal.         Judgment: Judgment normal.         Assessment/Plan:     Diagnosis and associated orders:   1. Viral URI with cough  methylPREDNISolone (MEDROL DOSEPAK) 4 MG Tablet Therapy Pack    promethazine-dextromethorphan (PROMETHAZINE-DM) 6.25-15 MG/5ML syrup             Comments/MDM:   Pt is clinically stable at today's acute urgent care visit.  No acute distress noted. Appropriate for outpatient management at this time.     Acute problem. I have discussed with patient that symptoms are viral in etiology. I have recommended supportive care to include; Increased fluids, rest,cough syrup as prescribed, medrol dose pack as prescribed, alternating Tylenol and/or ibuprofen per manufactures instructions for symptomatic relief and fevers, and the use of a humidifier. Patient is to return to UC or go to ER for any new or worsening s/s, and follow up with PCP for recheck. Patient is agreeable with plan of care and verbalized good understanding.              Discussed DDx, management options (risks,benefits, and alternatives to planned treatment), natural progression and supportive care.  Expressed understanding and the treatment plan was agreed upon. Questions were encouraged and answered   Return to urgent care prn if new or worsening sx or if there is no improvement in condition prn.    Educated in Red flags and indications to immediately call 911 or present to the Emergency Department.   Advised the patient to follow-up with the primary care physician for recheck, reevaluation, and consideration of further management.    I personally reviewed prior external notes and test results pertinent to today's visit.  I have independently reviewed and interpreted all diagnostics ordered during this urgent care acute visit.       Please note that this dictation was created using voice recognition software. I have made a reasonable attempt  to correct obvious errors, but I expect that there are errors of grammar and possibly content that I did not discover before finalizing the note.    This note was electronically signed by JOSÉ Weeks

## 2024-06-11 LAB — HBA1C MFR BLD: 5.9 % (ref ?–5.8)

## 2024-06-18 ENCOUNTER — APPOINTMENT (OUTPATIENT)
Dept: MEDICAL GROUP | Age: 69
End: 2024-06-18
Payer: MEDICARE

## 2024-06-18 VITALS
SYSTOLIC BLOOD PRESSURE: 138 MMHG | OXYGEN SATURATION: 97 % | BODY MASS INDEX: 22.07 KG/M2 | DIASTOLIC BLOOD PRESSURE: 90 MMHG | HEIGHT: 69 IN | HEART RATE: 60 BPM | TEMPERATURE: 98.6 F | WEIGHT: 149 LBS

## 2024-06-18 DIAGNOSIS — E78.2 MIXED HYPERLIPIDEMIA: ICD-10-CM

## 2024-06-18 DIAGNOSIS — Z85.828 HISTORY OF SKIN CANCER: ICD-10-CM

## 2024-06-18 DIAGNOSIS — E55.9 VITAMIN D DEFICIENCY: ICD-10-CM

## 2024-06-18 DIAGNOSIS — N40.0 BPH WITHOUT URINARY OBSTRUCTION: ICD-10-CM

## 2024-06-18 DIAGNOSIS — F17.210 SMOKES LESS THAN 1 PACK A DAY WITH GREATER THAN 20 PACK YEAR HISTORY: ICD-10-CM

## 2024-06-18 DIAGNOSIS — Z12.11 SCREEN FOR COLON CANCER: ICD-10-CM

## 2024-06-18 DIAGNOSIS — Z98.890 S/P COLONOSCOPIC POLYPECTOMY: ICD-10-CM

## 2024-06-18 DIAGNOSIS — Z23 NEED FOR VACCINATION: ICD-10-CM

## 2024-06-18 DIAGNOSIS — R73.01 IFG (IMPAIRED FASTING GLUCOSE): ICD-10-CM

## 2024-06-18 PROCEDURE — G0009 ADMIN PNEUMOCOCCAL VACCINE: HCPCS | Performed by: INTERNAL MEDICINE

## 2024-06-18 PROCEDURE — 90677 PCV20 VACCINE IM: CPT | Performed by: INTERNAL MEDICINE

## 2024-06-18 PROCEDURE — 99214 OFFICE O/P EST MOD 30 MIN: CPT | Mod: 25 | Performed by: INTERNAL MEDICINE

## 2024-06-18 ASSESSMENT — PATIENT HEALTH QUESTIONNAIRE - PHQ9: CLINICAL INTERPRETATION OF PHQ2 SCORE: 0

## 2024-06-18 ASSESSMENT — ENCOUNTER SYMPTOMS
GASTROINTESTINAL NEGATIVE: 1
NEUROLOGICAL NEGATIVE: 1
PSYCHIATRIC NEGATIVE: 1
MUSCULOSKELETAL NEGATIVE: 1
CONSTITUTIONAL NEGATIVE: 1
CARDIOVASCULAR NEGATIVE: 1
RESPIRATORY NEGATIVE: 1
EYES NEGATIVE: 1

## 2024-06-18 NOTE — PROGRESS NOTES
Claudia Vega is a 69 y.o. male who presents with Diabetes Follow-up (Lab review medication refill )  The patient is here for followup of chronic medical problems listed below. The patient is compliant with medications and having no side effects from them. Denies chest pain, abdominal pain, dyspnea, myalgias, or cough.   Patient Active Problem List   Diagnosis    Mixed hyperlipidemia    Hypothyroidism due to acquired atrophy of thyroid    Tobacco dependence    Vitamin D deficiency disease    Neoplasm of uncertain behavior of connective and other soft tissue     Outpatient Medications Prior to Visit   Medication Sig Dispense Refill    rosuvastatin (CRESTOR) 40 MG tablet Take 1 Tablet by mouth every day. 90 Tablet 4    levothyroxine (SYNTHROID) 100 MCG Tab Take 1 Tablet by mouth every morning on an empty stomach. 90 Tablet 4    Cholecalciferol (VITAMIN D-3) 125 MCG (5000 UT) Tab Take 5,000 Units by mouth every day. 100 Tablet 3    Glucosamine HCl-MSM (MSM GLUCOSAMINE PO) Take  by mouth.      aspirin EC (ECOTRIN) 81 MG Tablet Delayed Response Take 81 mg by mouth every day.      methylPREDNISolone (MEDROL DOSEPAK) 4 MG Tablet Therapy Pack Follow schedule on package instructions. (Patient not taking: Reported on 6/18/2024) 21 Tablet 0     No facility-administered medications prior to visit.     Abstract on 06/13/2024   Component Date Value    Glycohemoglobin 06/11/2024 5.9 (A)       Lab Results   Component Value Date/Time    HBA1C 5.9 (A) 06/11/2024 12:00 AM    HBA1C 5.5 09/25/2023 12:00 AM     Lab Results   Component Value Date/Time    SODIUM 143 09/18/2019 04:40 AM    SODIUM 138 09/02/2015 09:04 AM    POTASSIUM 4.5 09/18/2019 04:40 AM    POTASSIUM 4.3 09/02/2015 09:04 AM    CHLORIDE 107 (H) 09/18/2019 04:40 AM    CHLORIDE 110 09/02/2015 09:04 AM    CO2 21 09/18/2019 04:40 AM    CO2 22 09/02/2015 09:04 AM    GLUCOSE 104 (H) 09/18/2019 04:40 AM    GLUCOSE 104 (H) 09/02/2015 09:04 AM    BUN 15  "09/18/2019 04:40 AM    BUN 18 09/02/2015 09:04 AM    CREATININE 1.16 09/18/2019 04:40 AM    CREATININE 1.06 09/02/2015 09:04 AM    BUNCREATRAT 13 09/18/2019 04:40 AM    ALKPHOSPHAT 66 09/18/2019 04:40 AM    ALKPHOSPHAT 57 09/02/2015 09:04 AM    ASTSGOT 30 09/18/2019 04:40 AM    ASTSGOT 19 09/02/2015 09:04 AM    ALTSGPT 45 (H) 09/18/2019 04:40 AM    ALTSGPT 26 09/02/2015 09:04 AM    TBILIRUBIN 0.3 09/18/2019 04:40 AM    TBILIRUBIN 0.4 09/02/2015 09:04 AM     No results found for: \"INR\"  Lab Results   Component Value Date/Time    CHOLSTRLTOT 151 09/18/2019 04:40 AM    CHOLSTRLTOT 226 (H) 09/02/2015 09:04 AM    LDL 75 09/18/2019 04:40 AM     (H) 09/02/2015 09:04 AM    HDL 60 09/18/2019 04:40 AM    HDL 54 09/02/2015 09:04 AM    TRIGLYCERIDE 79 09/18/2019 04:40 AM    TRIGLYCERIDE 92 09/02/2015 09:04 AM       No results found for: \"TESTOSTERONE\"  Lab Results   Component Value Date/Time    TSH 4.900 (H) 09/18/2019 04:40 AM     Lab Results   Component Value Date/Time    FREET4 0.96 09/02/2015 09:04 AM    FREET4 0.93 11/05/2014 09:05 AM     Lab Results   Component Value Date/Time    URICACID 5.6 12/24/2012 08:46 AM     No components found for: \"VITB12\"  Lab Results   Component Value Date/Time    25HYDROXY 46 09/02/2015 09:04 AM    25HYDROXY 57 11/05/2014 09:05 AM   '          HPI    Review of Systems   Constitutional: Negative.    HENT: Negative.     Eyes: Negative.    Respiratory: Negative.     Cardiovascular: Negative.    Gastrointestinal: Negative.    Genitourinary: Negative.    Musculoskeletal: Negative.    Skin: Negative.    Neurological: Negative.    Endo/Heme/Allergies: Negative.    Psychiatric/Behavioral: Negative.                Objective     BP (!) 138/90 (BP Location: Left arm, Patient Position: Sitting, BP Cuff Size: Adult)   Pulse 60   Temp 37 °C (98.6 °F) (Temporal)   Ht 1.753 m (5' 9\")   Wt 67.6 kg (149 lb)   SpO2 97%   BMI 22.00 kg/m²      Physical Exam  Constitutional:       General: He is not " in acute distress.     Appearance: He is well-developed. He is not diaphoretic.   HENT:      Head: Normocephalic and atraumatic.      Right Ear: External ear normal.      Left Ear: External ear normal.      Nose: Nose normal.      Mouth/Throat:      Pharynx: No oropharyngeal exudate.   Eyes:      General: No scleral icterus.        Right eye: No discharge.         Left eye: No discharge.      Conjunctiva/sclera: Conjunctivae normal.      Pupils: Pupils are equal, round, and reactive to light.   Neck:      Thyroid: No thyromegaly.      Vascular: No JVD.      Trachea: No tracheal deviation.   Cardiovascular:      Rate and Rhythm: Normal rate and regular rhythm.      Heart sounds: Normal heart sounds. No murmur heard.     No friction rub. No gallop.   Pulmonary:      Effort: Pulmonary effort is normal. No respiratory distress.      Breath sounds: Normal breath sounds. No stridor. No wheezing or rales.   Chest:      Chest wall: No tenderness.   Abdominal:      General: Bowel sounds are normal. There is no distension.      Palpations: Abdomen is soft. There is no mass.      Tenderness: There is no abdominal tenderness. There is no guarding or rebound.   Musculoskeletal:         General: No tenderness. Normal range of motion.      Cervical back: Normal range of motion and neck supple.   Lymphadenopathy:      Cervical: No cervical adenopathy.   Skin:     General: Skin is warm and dry.      Coloration: Skin is not pale.      Findings: No erythema or rash.   Neurological:      Mental Status: He is alert and oriented to person, place, and time.      Motor: No abnormal muscle tone.      Coordination: Coordination normal.      Deep Tendon Reflexes: Reflexes are normal and symmetric. Reflexes normal.   Psychiatric:         Behavior: Behavior normal.         Thought Content: Thought content normal.         Judgment: Judgment normal.                             Assessment & Plan        1. Need for vaccination     - Zoster Vac  Recomb Adjuvanted (SHINGRIX) 50 MCG/0.5ML Recon Susp; Inject 0.5 mL into the shoulder, thigh, or buttocks one time for 1 dose.  Dispense: 0.5 mL; Refill: 0  - Hepatitis B Vaccine Adult 20+  - Pneumococcal Conjugate Vaccine 20-Valent (6 wks+)  - Hepatitis A Vaccine Adult 19+    2. Screen for colon cancer     - Referral to GI for Colonoscopy    3. S/P colonoscopic polypectomy      - Referral to GI for Colonoscopy    4. Smokes less than 1 pack a day with greater than 20 pack year history      - REFERRAL TO LUNG CANCER SCREENING PROGRAM; Future    5. Vitamin D deficiency disease      - VITAMIN D,25 HYDROXY (DEFICIENCY); Future    6. Mixed hyperlipidemia  Under good control continue current regimen with Crestor 40 mg daily.  - Comp Metabolic Panel; Future  - CBC WITH DIFFERENTIAL; Future  - TSH; Future  - Lipid Profile; Future    7. IFG (impaired fasting glucose)  Good control Mediterranean diet and exercise.  - HEMOGLOBIN A1C; Future    8. BPH without urinary obstruction  Continue surveillance.  Check PSA  - PROSTATE SPECIFIC AG DIAGNOSTIC; Future    9. History of skin cancer  Prior history of skin cancer of the face.  Now has some BCCA's noted on his face that need to be further evaluated by his biopsy.  - Referral to Dermatology

## 2024-06-19 ENCOUNTER — APPOINTMENT (OUTPATIENT)
Dept: MEDICAL GROUP | Age: 69
End: 2024-06-19
Payer: MEDICARE

## 2024-06-24 ENCOUNTER — TELEPHONE (OUTPATIENT)
Dept: HEALTH INFORMATION MANAGEMENT | Facility: OTHER | Age: 69
End: 2024-06-24
Payer: MEDICARE

## 2024-06-24 NOTE — TELEPHONE ENCOUNTER
Outcome: Left message.    Called to verify program eligibility/ LVM with Direct line for call back/ Warm Transfer to Patient Outreach [phone]

## 2024-06-25 ENCOUNTER — TELEPHONE (OUTPATIENT)
Dept: HEALTH INFORMATION MANAGEMENT | Facility: OTHER | Age: 69
End: 2024-06-25
Payer: MEDICARE

## 2024-06-25 NOTE — TELEPHONE ENCOUNTER
[facility]  CONSULTATION    Patient: Marek Hedrick Date of Service: 6/30/2019   YOB: 1931 Admission Date: 6/29/2019   MRN: [id number] Attending: Raza Russell MD   PCP: Ant Patterson MD   Hospital Day: Hospital Day: 2     REQUESTING PHYSICIAN: Raza Russell MD  REASON FOR CONSULT:  Sinus pause  PRIMARY CARDIOLOGIST: None -> Shanika    CHIEF COMPLAINT:  Chief Complaint   Patient presents with   • Weakness       HISTORY OF PRESENT ILLNESS:   Marek Hedrick is a 87 year old male with history of AFIB (rate controlled not on OAC) HTN and DJD who presented 6/29 with slurred speech, weakness, and confusion. The patient is a poor historian and thus the following was gleamed from the chart.  \"present(ed) to the ED with right arm and right leg weakness/trembling, confusion, slurred speech. Patient apparently had the trembling and weakness in his right upper and lower extremity earlier in the afternoon and later on that evening patient was found on the floor with confusion and slurred speech. Patient still is somewhat confused, is not alert to time, is very hard of hearing this communication is limited. No family is present at bedside as the H&P is gathered from ER records. Patient currently reports symptoms are improved patient reports symptoms of weakness and slurred speech have all resolved.. Patient denies any chest pain shortness of breath abdominal pain headache or syncope.  Patient was diagnosed with stroke initiated on stroke protocol, neurology was called on consult\"     He was admitted and placed on telemetry which revealed ventricular pauses prompting our consult.     TELE reviewed:   AFIB with slow ventricular response; Rates in the 40s  Ventricular pause, longest 3 second @ 0330 on 6/30    CURRENT AND PREVIOUS CARDIAC STUDIES:     EKG:   Date: 6/30/19  AFIB, Low voltage, V rate 63    REVIEW OF SYSTEMS:   Unable to obtain given the patient's baseline mental status.  Outcome:verified eligible/ appt sched    Eligibility Screening Questions & Answer (LUNG CANCER SCREENING)      1. Age 50-77 yrs of age? 69  yrs old  (age of patient)     2. Total Years Smoking cigarettes? 47  yrs  (# yrs total smoking)     3. 20 pack year hx of smoking, or greater (average packs per day)? 1/2  pk PER DAY  (# yrs total smoking X   pack per day =47 X  .05 YRS PCK =23.5  packs yearS  (may be multiple calculations     4. Current smoker or if quit, has pt quit within last 15 yrs?  CURRENT SMOKER  (Current / # of years since pt quit)  yrs ago    5. Completed Lung Cancer screen CT with [facility] previously?    { Anything noted on previous CT involving lungs? (Nodules, Mass, Etc.)   (Yes- detailed notes- date of CT or No or N/A)  NONE    7. Any signs or symptoms of lung cancer? ( New / change to Cough, Wheezing, S.O.B., coughing up blood, unexplained weight loss within last year)? NONE  ( Yes - Details or NONE or N/A)      8. Previous history of lung cancer? NONE  (Yes- Details including dates or NONE or N/A)         PAST HISTORY:     PAST MEDICAL HISTORY:   Past Medical History:   Diagnosis Date   • Anemia    • Atrial fibrillation (CMS/HCC)    • Colon carcinoma (CMS/HCC)     in situ   • Colon polyps    • DJD (degenerative joint disease)    • Foraminal stenosis of lumbar region    • GERD (gastroesophageal reflux disease)    • Hypertension    • Osteoporosis, unspecified 07/01/2011    per  report       HOME MEDICATIONS:  Home Medications    Medication Directions Start Date End Date   ferrous sulfate 325 (65 FE) MG tablet Take 325 mg by mouth daily (with breakfast).     ciclopirox (PENLAC) 8 % topical solution Apply 1 application topically nightly.     hydrochlorothiazide (MICROZIDE) 12.5 MG capsule Take 1 capsule by mouth daily. 10/2/18    digoxin (LANOXIN) 0.125 MG tablet Take 1 tablet by mouth daily. 10/2/18    aspirin EC (ASPIRIN) 81 MG EC tablet Take 1 tablet by mouth daily.          ALLERGIES  ALLERGIES:   Allergen Reactions   • Cardizem [Diltiazem Hcl] HEADACHES and Palpitations       FAMILY HISTORY:  family history includes Diabetes in his mother, sister, and sister; High blood pressure in his sister; Psychiatric in his sister and sister; Stroke in his father and mother.    SOCIAL HISTORY:   reports that he quit smoking about 55 years ago. His smoking use included cigarettes. He started smoking about 103 years ago. He has never used smokeless tobacco. He reports that he does not drink alcohol or use drugs.    PHYSICAL EXAM:   Blood pressure 131/61, pulse 100, temperature 98.1 °F (36.7 °C), temperature source Oral, resp. rate 18, height 5' 11\" (1.803 m), weight 75.8 kg, SpO2 95 %.    Intake/Output Summary (Last 24 hours) at 6/30/2019 1002  Last data filed at 6/29/2019 2128  Gross per 24 hour   Intake --   Output 600 ml   Net -600 ml     PHYSICAL EXAM:  General: Resting comfortably, in NAD.   Eyes:   No xanthelasma, EOMI (extraocular movements intact)     No scleral icterus or conjunctival pallor.   ENT/Mouth:  Moist  mucous membranes, no palatal petechiae   Neck:   Spontaneous full range of motion, trachea midline, no obvious thyromegaly.   CVS:   Irregularly irregular S1S2 without S3/4 2/6 CAROLINA heard over LSB  Respiratory: CTA bilaterally   Abdomen:  Soft, nontender. No appreciable organomegaly.   Extremities:   No peripheral edema no digital cyanosis no clubbing   Msk:  No major joint pain, erythema, effusion.   Lymphatic:  No cervical or supraclavicular lymphadenopathy.  Skin:   Warm and dry, no rashes visualized to exposed skin.   Neuro:  A&O x 1 (hospital),     CN (cranial nerves) II-XII grossly intact. Motor and sensory grossly intact bilateral upper and lower extremities.    Answers questions inappropriately.   Psychiatric:  Pleasantly confused.       CURRENT MEDICATIONS:   Scheduled:  • digoxin  125 mcg Oral Daily   • ferrous sulfate  325 mg Oral Daily with breakfast   • [Held by provider] hydrochlorothiazide  12.5 mg Oral Daily   • sodium chloride (PF)  2 mL Injection 2 times per day   • heparin (porcine)  5,000 Units Subcutaneous 3 times per day   • aspirin  325 mg Oral Daily   • atorvastatin  80 mg Oral Nightly       Infusions:  • sodium chloride 0.9% infusion 50 mL/hr at 06/30/19 0637       PRN:  sodium chloride (PF), sodium chloride, sodium chloride, potassium CHLORIDE, potassium CHLORIDE, potassium CHLORIDE 20 mEq/100mL IVPB, potassium CHLORIDE, potassium CHLORIDE, potassium CHLORIDE 20 mEq/100mL IVPB, magnesium sulfate, magnesium sulfate, magnesium sulfate, acetaminophen **OR** acetaminophen, labetalol, diphenhydrAMINE, ondansetron, morphine  LABS & IMAGING:     CARDIAC MARKERS:   Recent Labs   Lab 06/30/19  0341 06/29/19  1700   CPK  --  814*   RAPDTR 1.30* 1.50*     NT-BNP:   NT proBNP (pg/mL)   Date Value   06/29/2019 928 (H)     CBC:   Recent Labs   Lab 06/30/19  0341 06/29/19  1700   WBC 5.4 5.7   HGB 11.2* 10.9*   HCT 35.3* 34.1*    158       CMP:  Recent Labs   Lab 06/30/19  0915 06/30/19  0341  06/29/19  1700   SODIUM  --  142 139   POTASSIUM 3.5 3.1* 3.7   CHLORIDE  --  110* 106   CO2  --  27 27   BUN  --  20 27*   CREATININE  --  0.72 0.90   GLUCOSE  --  88 113*   ALBUMIN  --   --  3.6   GPT  --   --  35   ALKPT  --   --  63   BILIRUBIN  --   --  1.2*   MG  --  2.1 2.0       LIPID PANEL:   Recent Labs   Lab 06/30/19  0341   CHOLESTEROL 123   TRIGLYCERIDE 31   HDL 59   CALCLDL 58       HbA1c:   Hemoglobin A1C (%)   Date Value   06/29/2019 5.9 (H)       COAGULATION STUDIES: No results found     DIG level: 0.8    ASSESSMENT, PROBLEM LIST, AND PLAN:   Marek Hedrick is a 87 year old male with history of AFIB not on OAC presented with symptoms concerning for stroke.   We were consulted as tele revealed slow ventricular response with pauses.      # AFIB with slow ventricular response    - Dig level normal    - Rates controlled below target     - DC dig and monitor on tele    - [id number] >2, not on OAC     - OAC recommended for CVA prevention     - Defer at this time given patient's CT head findings     # Ventricular pauses/asystole    - Max @ 3secs, asymptomatic while asleep    - Normal in the setting of AF   - Continue to monitor on tele    - Consult EP for PPM eval if pauses exceed 4 secs    # Stroke like symptoms; resolved    - CToH with acute findings concerning for mass vs. CVA    - Neurology consulted    - Agree with MRI brain    - AC deferred to neurology reccs     # Troponin elevation    - No clinical symptoms to suggest ACS    - ECG without acute ischemic changes    - Likely a result of CVA    - Downward trend, peaked at 1.5   - Cease further checks    - Obtain Echocardiogram     # Cardiac murmur    - echo as above     # Cognitive impairment/dementia    - Consider activation of HCPOA       Patient seen with Dr. Voss, who formulated the plan.      Dean Cross MD   Cardiology Fellow PGY 4  6/30/2019 10:02 AM    COVERAGE (AFTER 5 PM ON WEEKDAYS AND ON WEEKENDS)  Monday-Thursday (5 pm to 7am):  Please page [place] cardiology [place] at [phone].        I interviewed and examined the patient on this day with the cardiology trainee(s), whose note is above. I formulated the diagnostic plan on the indicated date as the trainee should have amended above.    (Chely)   Discussed with other services/attendings: ED MD   Original images reviewed today:  ECGs CXR   Complex decision-making today (in addition to the above): Slow VR uvaldo during sleep : HOLD (and likely stop) Dig as this likely represents progression of underlying conduction sys disease.  Trop elevation is MYOCARDIAL INJURY and not NSTEMI as he does not meet criteria.    JING Voss MD, FACC

## 2024-07-08 ENCOUNTER — OFFICE VISIT (OUTPATIENT)
Dept: SLEEP MEDICINE | Facility: MEDICAL CENTER | Age: 69
End: 2024-07-08
Attending: FAMILY MEDICINE
Payer: MEDICARE

## 2024-07-08 VITALS
RESPIRATION RATE: 16 BRPM | SYSTOLIC BLOOD PRESSURE: 110 MMHG | DIASTOLIC BLOOD PRESSURE: 72 MMHG | HEIGHT: 69 IN | WEIGHT: 153 LBS | HEART RATE: 76 BPM | OXYGEN SATURATION: 96 % | BODY MASS INDEX: 22.66 KG/M2

## 2024-07-08 DIAGNOSIS — F17.210 CIGARETTE SMOKER: ICD-10-CM

## 2024-07-08 PROCEDURE — G0296 VISIT TO DETERM LDCT ELIG: HCPCS | Performed by: FAMILY MEDICINE

## 2024-07-08 PROCEDURE — 3078F DIAST BP <80 MM HG: CPT | Performed by: FAMILY MEDICINE

## 2024-07-08 PROCEDURE — 3074F SYST BP LT 130 MM HG: CPT | Performed by: FAMILY MEDICINE

## 2024-07-08 ASSESSMENT — ENCOUNTER SYMPTOMS
FEVER: 0
SHORTNESS OF BREATH: 0
PALPITATIONS: 0
SPUTUM PRODUCTION: 0
WEIGHT LOSS: 1
CHILLS: 0
WHEEZING: 0
HEMOPTYSIS: 0

## 2024-07-22 ENCOUNTER — TELEPHONE (OUTPATIENT)
Dept: HEALTH INFORMATION MANAGEMENT | Facility: OTHER | Age: 69
End: 2024-07-22
Payer: MEDICARE

## 2024-08-07 ENCOUNTER — HOSPITAL ENCOUNTER (OUTPATIENT)
Dept: RADIOLOGY | Facility: MEDICAL CENTER | Age: 69
End: 2024-08-07
Attending: FAMILY MEDICINE
Payer: MEDICARE

## 2024-08-07 DIAGNOSIS — F17.210 CIGARETTE SMOKER: ICD-10-CM

## 2024-08-07 PROCEDURE — 71271 CT THORAX LUNG CANCER SCR C-: CPT

## 2024-08-13 ENCOUNTER — TELEPHONE (OUTPATIENT)
Dept: SLEEP MEDICINE | Facility: MEDICAL CENTER | Age: 69
End: 2024-08-13
Payer: MEDICARE

## 2024-08-13 NOTE — TELEPHONE ENCOUNTER
"Phoned patient with results of LDCT exam performed 8/8/24.  He did not answer.  Left a voicemail requesting he check chart for a message from me summarizing his results.  Provided my contact information in case he has questions or concerns. -Adela Quijano D.O.    Notified him that the results showed no concerning lung nodules  Recommend follow up CT in 12 months.    Informed patient of incidental findings of \"Hazy groundglass opacity involving the bilateral lower lobes and linear left lingula opacity, likely atelectasis,\" mild emphysematous changes, coronary artery calcifications with recommendation to follow up with PCP.  He has an appointment to see his PCP on 10/21/24    Referring provider, Dr. Mckay, was notified of results and incidental findings via this communication.    [facility] maintenance updated and patient sent lung cancer screening result letter.        CT-LUNG CANCER-SCREENING    Result Date: 8/8/2024 8/7/2024 10:50 AM HISTORY/REASON FOR EXAM:  Lung Cancer Screening 23.5 ttl pk-yrs TECHNIQUE/EXAM DESCRIPTION AND NUMBER OF VIEWS: Lung cancer screening without contrast. Low dose noncontrast helical images were obtained of the chest from the lung apices through the costophrenic sulci utilizing thin collimation and intervals with reconstructed images sent to PACS in axial, coronal and sagittal planes. Low dose optimization technique was utilized for this CT exam including automated exposure control and adjustment of the mA and/or kV according to patient size. COMPARISON: None. FINDINGS: Lung nodule: None. Lungs: Hazy groundglass opacity involving the bilateral lower lobes. Linear left lingula opacity. Emphysema: Mild Fibrosis: None Airway: Patent Pleura: No pleural effusion or pneumothorax. Thoracic aorta and great vessels:  No aneurysm. Heart and pericardium:  There is coronary artery calcification. No pericardial effusion. Lymph nodes: No enlarged mediastinal or hilar lymph nodes. Thoracic spine:  No " fracture or malalignment. No compression deformity. Chest wall:   Unremarkable. Visualized abdomen: No acute abnormality. ___________________________________     1. No suspicious pulmonary nodule. 2.  Hazy groundglass opacity involving the bilateral lower lobes and linear left lingula opacity, likely atelectasis. Lung RADS: 1 - Negative: No nodules and definitely benign nodules Findings: no lung nodules nodule(s) with specific calcifications: complete; central; popcorn; concentric rings and fat containing nodules Management: Continue annual screening with LDCT in 12 months

## 2024-10-19 LAB — HBA1C MFR BLD: 5.6 % (ref ?–5.8)

## 2024-10-21 ENCOUNTER — OFFICE VISIT (OUTPATIENT)
Dept: MEDICAL GROUP | Age: 69
End: 2024-10-21
Payer: MEDICARE

## 2024-10-21 VITALS
HEIGHT: 69 IN | DIASTOLIC BLOOD PRESSURE: 70 MMHG | OXYGEN SATURATION: 97 % | BODY MASS INDEX: 23.11 KG/M2 | HEART RATE: 59 BPM | WEIGHT: 156 LBS | TEMPERATURE: 98.4 F | SYSTOLIC BLOOD PRESSURE: 128 MMHG

## 2024-10-21 DIAGNOSIS — E55.9 VITAMIN D DEFICIENCY DISEASE: ICD-10-CM

## 2024-10-21 DIAGNOSIS — Z98.890 STATUS POST COLONOSCOPY WITH POLYPECTOMY: ICD-10-CM

## 2024-10-21 DIAGNOSIS — E03.4 HYPOTHYROIDISM DUE TO ACQUIRED ATROPHY OF THYROID: ICD-10-CM

## 2024-10-21 DIAGNOSIS — E55.9 VITAMIN D DEFICIENCY: ICD-10-CM

## 2024-10-21 DIAGNOSIS — F17.200 TOBACCO DEPENDENCE: ICD-10-CM

## 2024-10-21 DIAGNOSIS — E78.2 MIXED HYPERLIPIDEMIA: ICD-10-CM

## 2024-10-21 DIAGNOSIS — Z23 NEED FOR VACCINATION: ICD-10-CM

## 2024-10-21 PROBLEM — D48.19 NEOPLASM OF UNCERTAIN BEHAVIOR OF CONNECTIVE AND OTHER SOFT TISSUE: Status: RESOLVED | Noted: 2022-05-03 | Resolved: 2024-10-21

## 2024-10-21 PROCEDURE — 90662 IIV NO PRSV INCREASED AG IM: CPT | Performed by: INTERNAL MEDICINE

## 2024-10-21 PROCEDURE — G0008 ADMIN INFLUENZA VIRUS VAC: HCPCS | Performed by: INTERNAL MEDICINE

## 2024-10-21 PROCEDURE — 3074F SYST BP LT 130 MM HG: CPT | Performed by: INTERNAL MEDICINE

## 2024-10-21 PROCEDURE — 99214 OFFICE O/P EST MOD 30 MIN: CPT | Mod: 25 | Performed by: INTERNAL MEDICINE

## 2024-10-21 PROCEDURE — 3078F DIAST BP <80 MM HG: CPT | Performed by: INTERNAL MEDICINE

## 2024-10-21 RX ORDER — LEVOTHYROXINE SODIUM 100 UG/1
100 TABLET ORAL
Qty: 90 TABLET | Refills: 4 | Status: SHIPPED | OUTPATIENT
Start: 2024-10-21

## 2024-10-21 RX ORDER — ROSUVASTATIN CALCIUM 40 MG/1
40 TABLET, COATED ORAL DAILY
Qty: 90 TABLET | Refills: 4 | Status: SHIPPED | OUTPATIENT
Start: 2024-10-21

## 2024-10-21 ASSESSMENT — ENCOUNTER SYMPTOMS
EYES NEGATIVE: 1
NEUROLOGICAL NEGATIVE: 1
PSYCHIATRIC NEGATIVE: 1
MUSCULOSKELETAL NEGATIVE: 1
RESPIRATORY NEGATIVE: 1
GASTROINTESTINAL NEGATIVE: 1
CONSTITUTIONAL NEGATIVE: 1
CARDIOVASCULAR NEGATIVE: 1

## 2025-02-11 ENCOUNTER — TELEPHONE (OUTPATIENT)
Dept: MEDICAL GROUP | Age: 70
End: 2025-02-11
Payer: MEDICARE

## 2025-02-11 DIAGNOSIS — Z85.828 HISTORY OF SKIN CANCER: ICD-10-CM

## 2025-02-11 DIAGNOSIS — S46.912A STRAIN OF LEFT SHOULDER, INITIAL ENCOUNTER: ICD-10-CM

## 2025-02-13 NOTE — Clinical Note
REFERRAL APPROVAL NOTICE         Sent on February 13, 2025                   Raymundo Vega  [address] [facility] [address]                   Dear Mr. Vega,    After a careful review of the medical information and benefit coverage, [facility] has processed your referral. See below for additional details.    If applicable, you must be actively enrolled with your insurance for coverage of the authorized service. If you have any questions regarding your coverage, please contact your insurance directly.    REFERRAL INFORMATION   Referral #:  [id number]  Referred-To Department    Referred-By Provider:  Orthopedics    Jitendra Mckay M.D.   [facility] (joint)      [address]  [address]  [facility] [address]  [phone] [address] [place] [address]  [phone]    Referral Start Date:  02/11/2025  Referral End Date:   02/11/2026             SCHEDULING  If you do not already have an appointment, please call [phone] to make an appointment.     MORE INFORMATION  If you do not already have a [employer] account, sign up at: [email or web address]  You can access your medical information, make appointments, see lab results, billing information, and more.  If you have questions regarding this referral, please contact  the [facility] Referrals department at:             [phone]. Monday - Friday 8:00AM - 5:00PM.     Sincerely,    [facility]

## 2025-02-13 NOTE — Clinical Note
REFERRAL APPROVAL NOTICE         Sent on February 13, 2025                   Raymundo Vega  [address] [facility] [address]                   Dear Mr. eVga,    After a careful review of the medical information and benefit coverage, [facility] has processed your referral. See below for additional details.    If applicable, you must be actively enrolled with your insurance for coverage of the authorized service. If you have any questions regarding your coverage, please contact your insurance directly.    REFERRAL INFORMATION   Referral #:  [id number]  Referred-To Department    Referred-By Provider:  Dermatology    Jitendra Mckay M.D.   [facility]      [address]  [place] [address]  [phone] [address]  [place] [id number] [phone]    Referral Start Date:  02/11/2025  Referral End Date:   02/11/2026             SCHEDULING  If you do not already have an appointment, please call [phone] to make an appointment.     MORE INFORMATION  If you do not already have a [employer] account, sign up at: [email or web address]  You can access your medical information, make appointments, see lab results, billing information, and more.  If you have questions regarding this referral, please contact  the [facility] Referrals department at:             [phone]. Monday - Friday 8:00AM - 5:00PM.     Sincerely,    [facility]

## 2025-04-15 ENCOUNTER — APPOINTMENT (OUTPATIENT)
Dept: URBAN - METROPOLITAN AREA CLINIC 22 | Facility: CLINIC | Age: 70
Setting detail: DERMATOLOGY
End: 2025-04-15

## 2025-04-15 DIAGNOSIS — Z71.89 OTHER SPECIFIED COUNSELING: ICD-10-CM

## 2025-04-15 DIAGNOSIS — L72.0 EPIDERMAL CYST: ICD-10-CM

## 2025-04-15 DIAGNOSIS — L57.0 ACTINIC KERATOSIS: ICD-10-CM

## 2025-04-15 DIAGNOSIS — B07.8 OTHER VIRAL WARTS: ICD-10-CM

## 2025-04-15 DIAGNOSIS — D36.1 BENIGN NEOPLASM OF PERIPHERAL NERVES AND AUTONOMIC NERVOUS SYSTEM: ICD-10-CM

## 2025-04-15 DIAGNOSIS — L81.4 OTHER MELANIN HYPERPIGMENTATION: ICD-10-CM

## 2025-04-15 DIAGNOSIS — D18.0 HEMANGIOMA: ICD-10-CM

## 2025-04-15 PROBLEM — D18.01 HEMANGIOMA OF SKIN AND SUBCUTANEOUS TISSUE: Status: ACTIVE | Noted: 2025-04-15

## 2025-04-15 PROBLEM — D48.5 NEOPLASM OF UNCERTAIN BEHAVIOR OF SKIN: Status: ACTIVE | Noted: 2025-04-15

## 2025-04-15 PROCEDURE — 11102 TANGNTL BX SKIN SINGLE LES: CPT | Mod: 59

## 2025-04-15 PROCEDURE — ? LIQUID NITROGEN

## 2025-04-15 PROCEDURE — 17110 DESTRUCTION B9 LES UP TO 14: CPT

## 2025-04-15 PROCEDURE — ? COUNSELING

## 2025-04-15 PROCEDURE — ? ADDITIONAL NOTES

## 2025-04-15 PROCEDURE — 17000 DESTRUCT PREMALG LESION: CPT | Mod: 59

## 2025-04-15 PROCEDURE — ? BIOPSY BY SHAVE METHOD

## 2025-04-15 PROCEDURE — 11103 TANGNTL BX SKIN EA SEP/ADDL: CPT | Mod: 59

## 2025-04-15 PROCEDURE — 17003 DESTRUCT PREMALG LES 2-14: CPT | Mod: 59

## 2025-04-15 PROCEDURE — 99203 OFFICE O/P NEW LOW 30 MIN: CPT | Mod: 25

## 2025-04-15 ASSESSMENT — LOCATION ZONE DERM
LOCATION ZONE: TRUNK
LOCATION ZONE: HAND
LOCATION ZONE: EAR
LOCATION ZONE: FACE
LOCATION ZONE: ARM

## 2025-04-15 ASSESSMENT — LOCATION DETAILED DESCRIPTION DERM
LOCATION DETAILED: RIGHT DISTAL DORSAL FOREARM
LOCATION DETAILED: LEFT PROXIMAL DORSAL FOREARM
LOCATION DETAILED: RIGHT PROXIMAL DORSAL FOREARM
LOCATION DETAILED: LEFT HYPOTHENAR EMINENCE
LOCATION DETAILED: LEFT INFERIOR FOREHEAD
LOCATION DETAILED: LEFT SUPERIOR HELIX
LOCATION DETAILED: RIGHT SUPERIOR LATERAL MALAR CHEEK
LOCATION DETAILED: LEFT CENTRAL BUCCAL CHEEK
LOCATION DETAILED: RIGHT INFERIOR MEDIAL MIDBACK
LOCATION DETAILED: LEFT MEDIAL MALAR CHEEK
LOCATION DETAILED: RIGHT MEDIAL MALAR CHEEK
LOCATION DETAILED: LEFT LATERAL MALAR CHEEK
LOCATION DETAILED: LEFT DISTAL DORSAL FOREARM
LOCATION DETAILED: LEFT CENTRAL MALAR CHEEK
LOCATION DETAILED: RIGHT ANTECUBITAL SKIN
LOCATION DETAILED: LEFT INFERIOR CENTRAL MALAR CHEEK

## 2025-04-15 ASSESSMENT — LOCATION SIMPLE DESCRIPTION DERM
LOCATION SIMPLE: LEFT FOREHEAD
LOCATION SIMPLE: RIGHT CHEEK
LOCATION SIMPLE: RIGHT LOWER BACK
LOCATION SIMPLE: RIGHT FOREARM
LOCATION SIMPLE: LEFT FOREARM
LOCATION SIMPLE: LEFT EAR
LOCATION SIMPLE: LEFT CHEEK
LOCATION SIMPLE: LEFT HAND
LOCATION SIMPLE: RIGHT ELBOW

## 2025-04-28 ENCOUNTER — HOSPITAL ENCOUNTER (OUTPATIENT)
Dept: LAB | Facility: MEDICAL CENTER | Age: 70
End: 2025-04-28
Attending: INTERNAL MEDICINE
Payer: MEDICARE

## 2025-04-28 DIAGNOSIS — E55.9 VITAMIN D DEFICIENCY: ICD-10-CM

## 2025-04-28 DIAGNOSIS — E78.2 MIXED HYPERLIPIDEMIA: ICD-10-CM

## 2025-04-28 DIAGNOSIS — R73.01 IFG (IMPAIRED FASTING GLUCOSE): ICD-10-CM

## 2025-04-28 DIAGNOSIS — N40.0 BPH WITHOUT URINARY OBSTRUCTION: ICD-10-CM

## 2025-04-28 LAB
25(OH)D3 SERPL-MCNC: 72 NG/ML (ref 30–100)
ALBUMIN SERPL BCP-MCNC: 4.2 G/DL (ref 3.2–4.9)
ALBUMIN/GLOB SERPL: 1.5 G/DL
ALP SERPL-CCNC: 63 U/L (ref 30–99)
ALT SERPL-CCNC: 25 U/L (ref 2–50)
ANION GAP SERPL CALC-SCNC: 10 MMOL/L (ref 7–16)
AST SERPL-CCNC: 24 U/L (ref 12–45)
BASOPHILS # BLD AUTO: 0.8 % (ref 0–1.8)
BASOPHILS # BLD: 0.08 K/UL (ref 0–0.12)
BILIRUB SERPL-MCNC: 0.4 MG/DL (ref 0.1–1.5)
BUN SERPL-MCNC: 19 MG/DL (ref 8–22)
CALCIUM ALBUM COR SERPL-MCNC: 9.7 MG/DL (ref 8.5–10.5)
CALCIUM SERPL-MCNC: 9.9 MG/DL (ref 8.5–10.5)
CHLORIDE SERPL-SCNC: 108 MMOL/L (ref 96–112)
CHOLEST SERPL-MCNC: 164 MG/DL (ref 100–199)
CO2 SERPL-SCNC: 25 MMOL/L (ref 20–33)
CREAT SERPL-MCNC: 1.12 MG/DL (ref 0.5–1.4)
EOSINOPHIL # BLD AUTO: 0.14 K/UL (ref 0–0.51)
EOSINOPHIL NFR BLD: 1.3 % (ref 0–6.9)
ERYTHROCYTE [DISTWIDTH] IN BLOOD BY AUTOMATED COUNT: 51.4 FL (ref 35.9–50)
EST. AVERAGE GLUCOSE BLD GHB EST-MCNC: 134 MG/DL
FASTING STATUS PATIENT QL REPORTED: NORMAL
GFR SERPLBLD CREATININE-BSD FMLA CKD-EPI: 70 ML/MIN/1.73 M 2
GLOBULIN SER CALC-MCNC: 2.8 G/DL (ref 1.9–3.5)
GLUCOSE SERPL-MCNC: 103 MG/DL (ref 65–99)
HBA1C MFR BLD: 6.3 % (ref 4–5.6)
HCT VFR BLD AUTO: 50.1 % (ref 42–52)
HDLC SERPL-MCNC: 71 MG/DL
HGB BLD-MCNC: 17.1 G/DL (ref 14–18)
IMM GRANULOCYTES # BLD AUTO: 0.08 K/UL (ref 0–0.11)
IMM GRANULOCYTES NFR BLD AUTO: 0.8 % (ref 0–0.9)
LDLC SERPL CALC-MCNC: 83 MG/DL
LYMPHOCYTES # BLD AUTO: 2.09 K/UL (ref 1–4.8)
LYMPHOCYTES NFR BLD: 20.1 % (ref 22–41)
MCH RBC QN AUTO: 33.6 PG (ref 27–33)
MCHC RBC AUTO-ENTMCNC: 34.1 G/DL (ref 32.3–36.5)
MCV RBC AUTO: 98.4 FL (ref 81.4–97.8)
MONOCYTES # BLD AUTO: 0.77 K/UL (ref 0–0.85)
MONOCYTES NFR BLD AUTO: 7.4 % (ref 0–13.4)
NEUTROPHILS # BLD AUTO: 7.22 K/UL (ref 1.82–7.42)
NEUTROPHILS NFR BLD: 69.6 % (ref 44–72)
NRBC # BLD AUTO: 0 K/UL
NRBC BLD-RTO: 0 /100 WBC (ref 0–0.2)
PLATELET # BLD AUTO: 182 K/UL (ref 164–446)
PMV BLD AUTO: 10.3 FL (ref 9–12.9)
POTASSIUM SERPL-SCNC: 4.6 MMOL/L (ref 3.6–5.5)
PROT SERPL-MCNC: 7 G/DL (ref 6–8.2)
PSA SERPL DL<=0.01 NG/ML-MCNC: 0.96 NG/ML (ref 0–4)
RBC # BLD AUTO: 5.09 M/UL (ref 4.7–6.1)
SODIUM SERPL-SCNC: 143 MMOL/L (ref 135–145)
TRIGL SERPL-MCNC: 51 MG/DL (ref 0–149)
TSH SERPL-ACNC: 1.17 UIU/ML (ref 0.38–5.33)
WBC # BLD AUTO: 10.4 K/UL (ref 4.8–10.8)

## 2025-04-28 PROCEDURE — 80053 COMPREHEN METABOLIC PANEL: CPT

## 2025-04-28 PROCEDURE — 85025 COMPLETE CBC W/AUTO DIFF WBC: CPT

## 2025-04-28 PROCEDURE — 84443 ASSAY THYROID STIM HORMONE: CPT

## 2025-04-28 PROCEDURE — 82306 VITAMIN D 25 HYDROXY: CPT

## 2025-04-28 PROCEDURE — 80061 LIPID PANEL: CPT

## 2025-04-28 PROCEDURE — 83036 HEMOGLOBIN GLYCOSYLATED A1C: CPT | Mod: GA

## 2025-04-28 PROCEDURE — 84153 ASSAY OF PSA TOTAL: CPT

## 2025-04-28 PROCEDURE — 36415 COLL VENOUS BLD VENIPUNCTURE: CPT

## 2025-05-06 ENCOUNTER — OFFICE VISIT (OUTPATIENT)
Dept: MEDICAL GROUP | Age: 70
End: 2025-05-06
Payer: MEDICARE

## 2025-05-06 VITALS
TEMPERATURE: 98.5 F | BODY MASS INDEX: 23.25 KG/M2 | SYSTOLIC BLOOD PRESSURE: 140 MMHG | HEIGHT: 69 IN | DIASTOLIC BLOOD PRESSURE: 82 MMHG | OXYGEN SATURATION: 95 % | HEART RATE: 69 BPM | WEIGHT: 157 LBS

## 2025-05-06 DIAGNOSIS — E55.9 VITAMIN D DEFICIENCY DISEASE: ICD-10-CM

## 2025-05-06 DIAGNOSIS — E55.9 VITAMIN D DEFICIENCY: ICD-10-CM

## 2025-05-06 DIAGNOSIS — E78.2 MIXED HYPERLIPIDEMIA: ICD-10-CM

## 2025-05-06 DIAGNOSIS — Z00.00 MEDICARE ANNUAL WELLNESS VISIT, SUBSEQUENT: ICD-10-CM

## 2025-05-06 DIAGNOSIS — F17.200 TOBACCO DEPENDENCE: ICD-10-CM

## 2025-05-06 DIAGNOSIS — R73.01 IFG (IMPAIRED FASTING GLUCOSE): ICD-10-CM

## 2025-05-06 DIAGNOSIS — N40.0 BPH WITHOUT URINARY OBSTRUCTION: ICD-10-CM

## 2025-05-06 DIAGNOSIS — E03.4 HYPOTHYROIDISM DUE TO ACQUIRED ATROPHY OF THYROID: ICD-10-CM

## 2025-05-06 DIAGNOSIS — Z98.890 STATUS POST COLONOSCOPY WITH POLYPECTOMY: ICD-10-CM

## 2025-05-06 DIAGNOSIS — E78.5 DYSLIPIDEMIA: ICD-10-CM

## 2025-05-06 PROCEDURE — 3077F SYST BP >= 140 MM HG: CPT | Performed by: INTERNAL MEDICINE

## 2025-05-06 PROCEDURE — 3079F DIAST BP 80-89 MM HG: CPT | Performed by: INTERNAL MEDICINE

## 2025-05-06 PROCEDURE — G0439 PPPS, SUBSEQ VISIT: HCPCS | Performed by: INTERNAL MEDICINE

## 2025-05-06 RX ORDER — LEVOTHYROXINE SODIUM 100 UG/1
100 TABLET ORAL
Qty: 100 TABLET | Refills: 2 | Status: SHIPPED | OUTPATIENT
Start: 2025-05-06

## 2025-05-06 RX ORDER — ROSUVASTATIN CALCIUM 40 MG/1
40 TABLET, COATED ORAL DAILY
Qty: 100 TABLET | Refills: 2 | Status: SHIPPED | OUTPATIENT
Start: 2025-05-06

## 2025-05-06 ASSESSMENT — PATIENT HEALTH QUESTIONNAIRE - PHQ9: CLINICAL INTERPRETATION OF PHQ2 SCORE: 0

## 2025-05-06 ASSESSMENT — ACTIVITIES OF DAILY LIVING (ADL): BATHING_REQUIRES_ASSISTANCE: 0

## 2025-05-06 ASSESSMENT — ENCOUNTER SYMPTOMS: GENERAL WELL-BEING: GOOD

## 2025-05-06 ASSESSMENT — FIBROSIS 4 INDEX: FIB4 SCORE: 1.846153846153846154

## 2025-05-06 NOTE — PROGRESS NOTES
Chief Complaint   Patient presents with    Annual Exam     AWV  lab review      History of Present Illness  The patient is a 69-year-old male who presents for an annual wellness visit.    Bilateral Shoulder Pain  He reports persistent bilateral shoulder pain, which he describes as a constant dragging sensation. The pain is severe enough to disrupt his sleep, and he finds it difficult to return to sleep once awakened. He received an injection in his left shoulder from Dr. Silva in 02/2025, which provided some relief but did not completely alleviate the pain. He also had a similar injection in his right shoulder on 10/13/2020. Despite undergoing physical therapy, he continues to experience pain, particularly when sleeping in certain positions. He is considering receiving injections in both shoulders if the pain persists. He has been performing exercises with rubber bands for his right shoulder.  - Onset: Persistent pain.  - Location: Bilateral shoulders.  - Duration: Ongoing despite injections and physical therapy.  - Character: Constant dragging sensation.  - Alleviating/Aggravating Factors: Injections provided some relief; physical therapy; pain persists when sleeping in certain positions; exercises with rubber bands.  - Timing: Pain disrupts sleep.  - Severity: Severe enough to disrupt sleep.    Borderline Blood Sugar Levels  He admits to consuming sweets and beer in the week leading up to his blood work and is curious if these could have contributed to his borderline blood sugar levels. He is currently taking over-the-counter aspirin and vitamin D supplements.  - Alleviating/Aggravating Factors: Consuming sweets and beer.    Skin Cancer Diagnosis  He has been diagnosed with skin cancer and has an appointment scheduled for 05/12/2025 for further evaluation.    Smoking Habits  He is a smoker and is interested in quitting, but does not wish to use medication to aid in this process.    SOCIAL HISTORY  The patient  admits to smoking a pipe. The patient admits to drinking alcohol, specifically beer.      HPI:  Hans Vega is a 69 y.o. here for Medicare Annual Wellness Visit     Patient Active Problem List    Diagnosis Date Noted    Status post colonoscopy with polypectomy 10/21/2024    Vitamin D deficiency disease 12/11/2013    Hypothyroidism due to acquired atrophy of thyroid 04/04/2013    Tobacco dependence 04/04/2013    Mixed hyperlipidemia 01/07/2013       Current Outpatient Medications   Medication Sig Dispense Refill    levothyroxine (SYNTHROID) 100 MCG Tab Take 1 Tablet by mouth every morning on an empty stomach. 90 Tablet 4    rosuvastatin (CRESTOR) 40 MG tablet Take 1 Tablet by mouth every day. 90 Tablet 4    Cholecalciferol (VITAMIN D-3) 125 MCG (5000 UT) Tab Take 5,000 Units by mouth every day. 100 Tablet 3    Glucosamine HCl-MSM (MSM GLUCOSAMINE PO) Take  by mouth.      aspirin EC (ECOTRIN) 81 MG Tablet Delayed Response Take 81 mg by mouth every day.       No current facility-administered medications for this visit.          Current supplements as per medication list.     Allergies: Zetia [ezetimibe]    Current social contact/activities:      He  reports that he has been smoking pipe and cigarettes. He started smoking about 47 years ago. He has a 23.9 pack-year smoking history. He has never used smokeless tobacco. He reports current alcohol use of about 12.6 oz of alcohol per week. He reports that he does not use drugs.  Ready to quit: Not Answered  Counseling given: Not Answered      ROS:    Gait: Uses no assistive device  Ostomy: No  Other tubes: No  Amputations: No  Chronic oxygen use: No  Last eye exam: unknown   Wears hearing aids: No   : Denies any urinary leakage during the last 6 months    Screening:    Depression Screening  Little interest or pleasure in doing things?  0 - not at all  Feeling down, depressed , or hopeless? 0 - not at all  Patient Health Questionnaire Score: 0     If depressive  symptoms identified deferred to follow up visit unless specifically addressed in assessment and plan.    Interpretation of PHQ-9 Total Score   Score Severity   1-4 No Depression   5-9 Mild Depression   10-14 Moderate Depression   15-19 Moderately Severe Depression   20-27 Severe Depression    Screening for Cognitive Impairment  Do you or any of your friends or family members have any concern about your memory? No  Three Minute Recall (Village, Kitchen, Baby) 3/3    Teddy clock face with all 12 numbers and set the hands to show 10 minutes past 11.  Yes    Cognitive concerns identified deferred for follow up unless specifically addressed in assessment and plan.    Fall Risk Assessment  Has the patient had two or more falls in the last year or any fall with injury in the last year?  No    Safety Assessment  Do you always wear your seatbelt?  Yes  Any changes to home needed to function safely? No  Difficulty hearing.  No  Patient counseled about all safety risks that were identified.    Functional Assessment ADLs  Are there any barriers preventing you from cooking for yourself or meeting nutritional needs?  No.    Are there any barriers preventing you from driving safely or obtaining transportation?  No.    Are there any barriers preventing you from using a telephone or calling for help?  No    Are there any barriers preventing you from shopping?  No.    Are there any barriers preventing you from taking care of your own finances?  No    Are there any barriers preventing you from managing your medications?  No    Are there any barriers preventing you from showering, bathing or dressing yourself? No    Are there any barriers preventing you from doing housework or laundry? No  Are there any barriers preventing you from using the toilet?No  Are you currently engaging in any exercise or physical activity?  Yes.      Self-Assessment of Health  What is your perception of your health? Good    Do you sleep more than six hours a  night? No    In the past 7 days, how much did pain keep you from doing your normal work? Some    Do you spend quality time with family or friends (virtually or in person)? Yes    Do you usually eat a heart healthy diet that constists of a variety of fruits, vegetables, whole grains and fiber? Yes    Do you eat foods high in fat and/or Fast Food more than three times per week? No    How concerned are you that your medical conditions are not being well managed? Not at all    Are you worried that in the next 2 months, you may not have stable housing that you own, rent, or stay in as part of a household? No      Advance Care Planning  Do you have an Advance Directive, Living Will, Durable Power of , or POLST? No                 Health Maintenance Summary            Current Care Gaps       Hepatitis B Vaccine (Hep B) (2 of 3 - 19+ 3-dose series) Overdue since 10/24/2022      06/18/2024  Order placed for Hepatitis B Vaccine Adult 20+ by Jitendra Mckay M.D.    09/26/2022  Imm Admin: Hepatitis B Vaccine (Adol/Adult)              COVID-19 Vaccine (4 - 2024-25 season) Overdue since 9/1/2024 11/27/2021  Imm Admin: MODERNA SARS-COV-2 VACCINE (12+)    04/14/2021  Imm Admin: MODERNA SARS-COV-2 VACCINE (12+)    03/16/2021  Imm Admin: MODERNA SARS-COV-2 VACCINE (12+)              Annual Wellness Visit (Yearly) Overdue since 10/3/2024      10/03/2023  Level of Service: IN ANNUAL WELLNESS VISIT-INCLUDES PPPS SUBSEQUE*    10/03/2023  Visit Dx: Medicare annual wellness visit, subsequent              Zoster (Shingles) Vaccines (1 of 2) Never done     No completion history exists for this topic.                      Needs Review       Hepatitis C Screening  Tentatively Complete      03/19/2022  HEPATITIS C RNA QUANT.                      Upcoming       Lung Cancer Screening (Yearly) Next due on 8/7/2025 08/07/2024  CT-LUNG CANCER-SCREENING              IMM DTaP/Tdap/Td Vaccine (2 - Td or Tdap) Next due on 3/21/2028       03/21/2018  Imm Admin: Tdap Vaccine    07/03/2010  Imm Admin: TD Vaccine              Colorectal Cancer Screening (Colonoscopy - Preferred) Next due on 9/30/2034 09/30/2024  COLONOSCOPY RESULTS    07/30/2014  AMB REFERRAL TO GI FOR COLONOSCOPY    12/24/2012  OCCULT BLOOD FECES IMMUNOASSAY                      Completed or No Longer Recommended       Influenza Vaccine (Series Information) Completed      10/21/2024  Imm Admin: Influenza high-dose trivalent (PF)    10/03/2023  Imm Admin: Influenza Vaccine Adult HD    09/26/2022  Imm Admin: Influenza Vaccine Adult HD    09/27/2021  Imm Admin: Influenza Vaccine Adult HD    10/31/2020  Imm Admin: Influenza Vaccine Adult HD     Only the first 5 history entries have been loaded, but more history exists.            Abdominal Aortic Aneurysm (AAA) Screening  Completed      10/13/2023  US-ABDOMINAL AORTA W/O DOPPLER              Lung Cancer Screening Shared Decision Making  Completed      07/08/2024  Registry Metric: Lung Cancer Shared Decision Making Conversation Date              Pneumococcal Vaccine: 50+ Years (Series Information) Completed      06/18/2024  Imm Admin: Pneumococcal Conjugate Vaccine (PCV20)    09/25/2019  Imm Admin: Pneumococcal Conjugate Vaccine (Prevnar/PCV-13)    03/21/2018  Imm Admin: Pneumococcal polysaccharide vaccine (PPSV-23)              Hepatitis A Vaccine (Hep A) (Series Information) Aged Out      06/18/2024  Order placed for Hepatitis A Vaccine Adult 19+ by Jitendra Mckay M.D.    10/03/2023  Imm Admin: Hepatitis A Vaccine, Adult              HPV Vaccines (Series Information) Aged Out      No completion history exists for this topic.              Polio Vaccine (Inactivated Polio) (Series Information) Aged Out     No completion history exists for this topic.              Meningococcal Immunization (Series Information) Aged Out     No completion history exists for this topic.                            Patient Care Team:  Jitendra NAGY  "LIANA Mckay as PCP - General (Internal Medicine)        Social History     Tobacco Use    Smoking status: Every Day     Current packs/day: 0.50     Average packs/day: 0.5 packs/day for 47.8 years (23.9 ttl pk-yrs)     Types: Pipe, Cigarettes     Start date: 7/8/1977    Smokeless tobacco: Never   Vaping Use    Vaping status: Never Used   Substance Use Topics    Alcohol use: Yes     Alcohol/week: 12.6 oz     Types: 21 Cans of beer per week    Drug use: No     Family History   Problem Relation Age of Onset    Non-contributory Mother     Non-contributory Father      He  has a past medical history of Hyperlipidemia, Thyroid disease, and Unspecified vitamin D deficiency.   Past Surgical History:   Procedure Laterality Date    OTHER ABDOMINAL SURGERY      Appencectomy       Exam:   BP (!) 140/82 (BP Location: Left arm, Patient Position: Sitting, BP Cuff Size: Adult)   Pulse 69   Temp 36.9 °C (98.5 °F) (Temporal)   Ht 1.753 m (5' 9\")   Wt 71.2 kg (157 lb)   SpO2 95%  Body mass index is 23.18 kg/m².    Hearing good.    Dentition good  Alert, oriented in no acute distress.  Eye contact is good, speech goal directed, affect calm        Assessment & Plan  1. Bilateral shoulder pain.  - He received a left shoulder injection on 02/18/2025, and it will be 3 months on 05/18/2025.  - He is advised to schedule another injection for both shoulders during the week of 05/19/2025 or 05/23/2025.  - He should continue with his physical therapy exercises.  - The right shoulder responded well to previous injections, but the left shoulder has more damage and is not responding as expected to physical therapy.    2. Prediabetes.  - His blood work from 04/28/2025 shows borderline blood sugar and A1c levels.  - He is advised to reduce his intake of sugar, sweets, and alcohol.  - A diet rich in protein and low in carbohydrates, such as the Mediterranean diet, is recommended.  - Blood work will be repeated in October 2025.    3. Skin " cancer.  - He has an appointment scheduled for 06/12/2025 to have the cancerous lesion removed.  - Previous dermatology visit resulted in removal of age spots and beauty marks.  - Continued monitoring and follow-up with dermatology are necessary.    4. Tobacco use.  - He is advised to quit smoking.  - Medications like Wellbutrin or Chantix were discussed but he prefers to try quitting cold turkey first.  - Smoking cessation support and counseling may be beneficial.    5. Health maintenance.  - His cholesterol levels are within the normal range.  - He is currently taking over-the-counter aspirin and vitamin D supplements.  - His thyroid function is normal.  - He should continue with his current regimen of rosuvastatin and thyroxine 100 mcg.  - Blood work will be repeated in October 2025.      Assessment and Plan. The following treatment and monitoring plan is recommended:    1. Medicare annual wellness visit, subsequent       2. Vitamin D deficiency disease     - VITAMIN D,25 HYDROXY (DEFICIENCY); Future    3. Mixed hyperlipidemia      - rosuvastatin (CRESTOR) 40 MG tablet; Take 1 Tablet by mouth every day.  Dispense: 100 Tablet; Refill: 2    4. Hypothyroidism due to acquired atrophy of thyroid      - levothyroxine (SYNTHROID) 100 MCG Tab; Take 1 Tablet by mouth every morning on an empty stomach.  Dispense: 100 Tablet; Refill: 2    5. Tobacco dependence       6. Status post colonoscopy with polypectomy        7. IFG (impaired fasting glucose)     - HEMOGLOBIN A1C; Future    8. Dyslipidemia       - Comp Metabolic Panel; Future  - CBC WITH DIFFERENTIAL; Future  - TSH; Future  - Lipid Profile; Future    9. BPH without urinary obstruction      - PROSTATE SPECIFIC AG DIAGNOSTIC; Future    10. Vitamin D deficiency disease     - Cholecalciferol (VITAMIN D-3) 125 MCG (5000 UT) Tab; Take 5,000 Units by mouth every day.  d    Services suggested: No services needed at this time  Health Care Screening: Age-appropriate preventive  services recommended by USPTF and ACIP covered by Medicare were discussed today. Services ordered if indicated and agreed upon by the patient.  Referrals offered: Community-based lifestyle interventions to reduce health risks and promote self-management and wellness, fall prevention, nutrition, physical activity, tobacco-use cessation, weight loss, and mental health services as per orders if indicated.    Discussion today about general wellness and lifestyle habits:    Prevent falls and reduce trip hazards; Cautioned about securing or removing rugs.  Have a working fire alarm and carbon monoxide detector;   Engage in regular physical activity and social activities     Follow-up: No follow-ups on file.

## 2025-05-12 ENCOUNTER — APPOINTMENT (OUTPATIENT)
Dept: URBAN - METROPOLITAN AREA CLINIC 22 | Facility: CLINIC | Age: 70
Setting detail: DERMATOLOGY
End: 2025-05-12

## 2025-05-12 PROBLEM — D04.62 CARCINOMA IN SITU OF SKIN OF LEFT UPPER LIMB, INCLUDING SHOULDER: Status: ACTIVE | Noted: 2025-05-12

## 2025-05-12 PROCEDURE — ? CURETTAGE AND DESTRUCTION

## 2025-05-12 PROCEDURE — 17271 DSTR MAL LES S/N/H/F/G 0.6-1: CPT

## 2025-05-12 PROCEDURE — ? DIAGNOSIS COMMENT

## 2025-05-12 NOTE — PROCEDURE: CURETTAGE AND DESTRUCTION
Detail Level: Detailed
Biopsy Photograph Reviewed: Yes
Accession # (Optional): [id number]
Number Of Curettages: 2
Size Of Lesion In Cm: 0.6
Size Of Lesion After Curettage: 0.8
Add Intralesional Injection: No
Total Volume (Ccs): 1
Anesthesia Type: 1% lidocaine without epinephrine
Anesthesia Volume In Cc: 1.5
Cautery Type: electrodesiccation
What Was Performed First?: Curettage
Final Size Statement: The size of the lesion after curettage was
Additional Information: (Optional): The wound was cleaned, and a pressure dressing was applied.  The patient received detailed post-op instructions.
Consent was obtained from the patient. The risks, benefits and alternatives to therapy were discussed in detail. Specifically, the risks of infection, scarring, bleeding, prolonged wound healing, nerve injury, incomplete removal, allergy to anesthesia and recurrence were addressed. Alternatives to ED&C, such as: surgical removal and XRT were also discussed.  Prior to the procedure, the treatment site was clearly identified and confirmed by the patient. All components of Universal Protocol/PAUSE Rule completed.
Post-Care Instructions: I reviewed with the patient in detail post-care instructions. Patient is to keep the area dry for 48 hours, and not to engage in any swimming until the area is healed. Should the patient develop any fevers, chills, bleeding, severe pain patient will contact the office immediately.
Bill As A Line Item Or As Units: Line Item

## 2025-11-07 ENCOUNTER — APPOINTMENT (OUTPATIENT)
Dept: MEDICAL GROUP | Age: 70
End: 2025-11-07
Payer: MEDICARE